# Patient Record
Sex: FEMALE | Race: BLACK OR AFRICAN AMERICAN | NOT HISPANIC OR LATINO | URBAN - METROPOLITAN AREA
[De-identification: names, ages, dates, MRNs, and addresses within clinical notes are randomized per-mention and may not be internally consistent; named-entity substitution may affect disease eponyms.]

---

## 2017-02-14 ENCOUNTER — HOSPITAL ENCOUNTER (EMERGENCY)
Facility: CLINIC | Age: 52
Discharge: HOME | End: 2017-02-14
Attending: EMERGENCY MEDICINE | Admitting: EMERGENCY MEDICINE
Payer: COMMERCIAL

## 2017-02-14 ENCOUNTER — APPOINTMENT (OUTPATIENT)
Dept: GENERAL RADIOLOGY | Facility: CLINIC | Age: 52
End: 2017-02-14
Attending: EMERGENCY MEDICINE
Payer: COMMERCIAL

## 2017-02-14 VITALS
SYSTOLIC BLOOD PRESSURE: 170 MMHG | WEIGHT: 293 LBS | HEIGHT: 69 IN | BODY MASS INDEX: 43.4 KG/M2 | HEART RATE: 100 BPM | OXYGEN SATURATION: 97 % | TEMPERATURE: 98.8 F | DIASTOLIC BLOOD PRESSURE: 88 MMHG

## 2017-02-14 DIAGNOSIS — M79.675 PAIN OF TOE OF LEFT FOOT: ICD-10-CM

## 2017-02-14 PROCEDURE — 25000132 ZZH RX MED GY IP 250 OP 250 PS 637: Performed by: EMERGENCY MEDICINE

## 2017-02-14 PROCEDURE — 73660 X-RAY EXAM OF TOE(S): CPT | Mod: LT

## 2017-02-14 PROCEDURE — 99283 EMERGENCY DEPT VISIT LOW MDM: CPT

## 2017-02-14 RX ORDER — ACETAMINOPHEN 500 MG
TABLET ORAL
Status: DISCONTINUED
Start: 2017-02-14 | End: 2017-02-14 | Stop reason: HOSPADM

## 2017-02-14 RX ORDER — ACETAMINOPHEN 500 MG
1000 TABLET ORAL ONCE
Status: COMPLETED | OUTPATIENT
Start: 2017-02-14 | End: 2017-02-14

## 2017-02-14 RX ADMIN — ACETAMINOPHEN 1000 MG: 500 TABLET ORAL at 11:43

## 2017-02-14 ASSESSMENT — ENCOUNTER SYMPTOMS
FEVER: 0
ROS SKIN COMMENTS: LEFT GREAT TOE PAIN

## 2017-02-14 NOTE — ED AVS SNAPSHOT
Emergency Department    [address] [facility] [address]    Phone:  [phone]    Fax:  [phone]                                       Terri Mullen   MRN: [id number]    Department:   Emergency Department   Date of Visit:  2/14/2017           Patient Information     Date Of Birth          1965        Your diagnoses for this visit were:     Pain of toe of left foot        You were seen by John Bowman MD.      Follow-up Information     Follow up with Clinic, [facility] In 3 days.    Contact information:    [address] [facility] [address] [phone]          Follow up with  Emergency Department.    Specialty:  EMERGENCY MEDICINE    Why:  As needed, If symptoms worsen    Contact information:    [address] [facility] [address] [phone]        Call Demarcus Barnes DPM.    Specialty:  Podiatry    Contact information:     [facility]  [address]  [facility] [address] [phone]          Discharge Instructions         Peripheral Neuropathy  Peripheral neuropathy is a condition that affects the nerves of the arms or legs. It causes a change in physical feeling. Sometimes it causes weakness in the muscles. You may feel tingling, numbness or shooting pains. Symptoms may be more common at night). Skin may be extra sensitive to light touch or temperature changes.  Neuropathy may be a complication of a chronic disease such as diabetes. A ruptured disk with pressure on the spinal nerve may also lead to the problem. Certain vitamin deficiencies may lead to it. It may also be caused by exposure to certain drugs or chemicals   Home care    Tell the healthcare provider about all medicines you take. This includes prescription and over-the-counter medicines, vitamins, and herbs. Ask if any of the medicines may be causing your problems. Do not make any changes to prescription medicines without talking to your  healthcare provider first.     You may be prescribed medicines to help relieve the tingling feeling or for pain. Take all medicines as directed.    A numb hand or foot may be more prone to injury. To help protect it:    Always use oven mitts.    Test water with an unaffected hand or foot.     Use caution when trimming nails. File sharp areas.    Wear shoes that fit well to avoid pressure points, blisters, and ulcers.    Inspect your hands and feet carefully (including the soles of your feet and between your toes) at least once a week. If you see red areas, sores, or other problems, tell your healthcare provider.  Follow-up care  Follow up with your doctor or as advised by our staff. You may need further testing or evaluation.  When to seek medical advice  Call your healthcare provider right away if any of the following occur:    Redness, swelling, cracking, or ulcer on any numb area, especially the feet    New symptoms of numbness or muscle weakness numbness    Loss of bowel or bladder control     Slurred speech, confusion, or trouble speaking, walking, or seeing    4524-0489 [employer]. [address] [place] [address]. All rights reserved. This information is not intended as a substitute for professional medical care. Always follow your healthcare professional's instructions.           Diabetic Foot Care  Diabetes can lead to a number of foot complications. Fortunately, most of these can be prevented with a little extra foot care. If diabetes is not well controlled, it can cause damage to blood vessels and result in poor circulation to the foot. When the skin does not get enough blood flow, it becomes prone to pressure sores and ulcers, which heal slowly.  Diabetes can also damage nerves, interfering with the ability to feel pain and pressure. When you can t feel your foot normally, it is easy to injure your skin, bones, and joints without knowing it. For these reasons diabetes increases  the risk of fungal infections, bunions, and ulcers. An ulcer is a sore or break in the skin. With ulcers, often the skin seems to have worn away. Deep ulcers can lead to bone infection.  Gangrene is the most serious foot complication of diabetes. It usually occurs on the tips of the toes as blackened areas of skin. The black area is dead tissue. In severe cases, gangrene spreads to involve the entire toe, other toes, and the entire foot. Foot or toe amputation may be required. Good foot care and blood sugar control can prevent this.   Home care    Wear comfortable, well-fitting shoes.    Wash your feet daily with warm water and mild soap.    After drying, apply a moisturizing cream or lotion.    Check your feet daily for skin breaks, blisters, swelling, or redness. Look between your toes as well. If you cannot see the bottoms of your feet, ask someone to look or use a mirror.    Wear cotton socks and change them every day.    Trim toenails carefully, and do not cut your cuticles.    Strive to keep your blood sugar under control with a combination of medicines, diet, and activity.    If you smoke and have diabetes, it is very important that you stop. Smoking reduces blood flow to your foot.    Schedule yearly foot exams.  Avoid activities that increase your risk of foot injury:    Do not walk barefoot.    Do not use heating pads or hot water bottles on your feet.    Do not put your foot in a hot tub without first checking the temperature with your hand.  Follow-up care  Follow up with your health care provider, or as advised. Report any cut, puncture, scrape, blister, or other injury to your foot. Also report if you have a bunion, ingrown toenail, or ulcer on your foot.   When to seek medical advice  Call your health care provider right away if any of these occur:    Black skin color anywhere on the foot    Open ulcer with pus draining from the wound    Increasing foot or leg pain    New areas of redness or swelling  or tender areas of the foot    Fever of 101 F or greater    5430-5928 [employer]. [address] [place] [address], [place] [address]. All rights reserved. This information is not intended as a substitute for professional medical care. Always follow your healthcare professional's instructions.            24 Hour Appointment Hotline       To make an appointment at any [facility], call [phone] ([phone]). If you don't have a family doctor or clinic, we will help you find one. [facility] are conveniently located to serve the needs of you and your family.             Review of your medicines      Our records show that you are taking the medicines listed below. If these are incorrect, please call your family doctor or clinic.        Dose / Directions Last dose taken    albuterol-ipratropium  MCG/ACT Inhaler   Commonly known as:  COMBIVENT   Dose:  2 puff        Inhale 2 puffs into the lungs every 6 hours as needed.   Refills:  0        * AMLODIPINE BESYLATE PO        Take  by mouth.   Refills:  0        * amLODIPine 5 MG tablet   Commonly known as:  NORVASC   Dose:  5 mg   Quantity:  30 tablet        Take 1 tablet by mouth daily.   Refills:  1        ASPIRIN PO   Dose:  81 mg        Take 81 mg by mouth.   Refills:  0        CITALOPRAM HYDROBROMIDE PO        Take  by mouth.   Refills:  0        HYDROcodone-acetaminophen 5-325 MG per tablet   Commonly known as:  NORCO   Dose:  1 tablet   Quantity:  15 tablet        Take 1 tablet by mouth every 4 hours as needed for pain   Refills:  0        LISINOPRIL PO        Take  by mouth.   Refills:  0        meclizine 12.5 MG tablet   Commonly known as:  ANTIVERT   Dose:  25 mg   Quantity:  30 tablet        Take 2 tablets by mouth 4 times daily as needed for dizziness.   Refills:  0        METOPROLOL SUCCINATE PO   Dose:  100 mg        Take 100 mg by mouth.   Refills:  0        NAPROXEN PO        Take  by mouth.   Refills:  0        NORTRIPTYLINE  HCL PO        Take  by mouth.   Refills:  0        OMEGA-3 FISH OIL PO        Take  by mouth.   Refills:  0        OMEPRAZOLE PO        Take  by mouth.   Refills:  0        SIMVASTATIN PO        Take  by mouth.   Refills:  0        SULINDAC PO        Take  by mouth.   Refills:  0        TRAZODONE HCL PO        Take  by mouth.   Refills:  0        * Notice:  This list has 2 medication(s) that are the same as other medications prescribed for you. Read the directions carefully, and ask your doctor or other care provider to review them with you.            Procedures and tests performed during your visit     XR Toe Left 2 Views      Orders Needing Specimen Collection     None      Pending Results     No orders found from 2/12/2017 to 2/15/2017.            Pending Culture Results     No orders found from 2/12/2017 to 2/15/2017.             Test Results from your hospital stay     2/14/2017 11:23 AM - Interface, Radiant Ib      Narrative     XR TOE LT G/E 2 VW 2/14/2017 11:19 AM    COMPARISON: None.    HISTORY: Distal toe pain.        Impression     IMPRESSION: No fractures are seen. Joints are preserved. No erosive  changes or radiodense foreign bodies are identified.    TRISTEN REYES                Clinical Quality Measure: Blood Pressure Screening     Your blood pressure was checked while you were in the emergency department today. The last reading we obtained was  BP: 170/88 . Please read the guidelines below about what these numbers mean and what you should do about them.  If your systolic blood pressure (the top number) is less than 120 and your diastolic blood pressure (the bottom number) is less than 80, then your blood pressure is normal. There is nothing more that you need to do about it.  If your systolic blood pressure (the top number) is 120-139 or your diastolic blood pressure (the bottom number) is 80-89, your blood pressure may be higher than it should be. You should have your blood pressure rechecked  "within a year by a primary care provider.  If your systolic blood pressure (the top number) is 140 or greater or your diastolic blood pressure (the bottom number) is 90 or greater, you may have high blood pressure. High blood pressure is treatable, but if left untreated over time it can put you at risk for heart attack, stroke, or kidney failure. You should have your blood pressure rechecked by a primary care provider within the next 4 weeks.  If your provider in the emergency department today gave you specific instructions to follow-up with your doctor or provider even sooner than that, you should follow that instruction and not wait for up to 4 weeks for your follow-up visit.        Thank you for choosing [facility]       Thank you for choosing [facility] for your care. Our goal is always to provide you with excellent care. Hearing back from our patients is one way we can continue to improve our services. Please take a few minutes to complete the written survey that you may receive in the mail after you visit with us. Thank you!        [employer] Information     [facility] lets you send messages to your doctor, view your test results, renew your prescriptions, schedule appointments and more. To sign up, go to [email or web address] . Click on \"Log in\" on the left side of the screen, which will take you to the Welcome page. Then click on \"Sign up Now\" on the right side of the page.     You will be asked to enter the access code listed below, as well as some personal information. Please follow the directions to create your username and password.     Your access code is: [email or web address]  Expires: 3/24/2017  3:35 AM     Your access code will  in 90 days. If you need help or a new code, please call your [facility] clinic or [phone].        Care EveryWhere ID     This is your Care EveryWhere ID. This could be used by other organizations to access your [facility] medical records  [id number]        After Visit Summary     "   This is your record. Keep this with you and show to your community pharmacist(s) and doctor(s) at your next visit.

## 2017-02-14 NOTE — DISCHARGE INSTRUCTIONS
Peripheral Neuropathy  Peripheral neuropathy is a condition that affects the nerves of the arms or legs. It causes a change in physical feeling. Sometimes it causes weakness in the muscles. You may feel tingling, numbness or shooting pains. Symptoms may be more common at night). Skin may be extra sensitive to light touch or temperature changes.  Neuropathy may be a complication of a chronic disease such as diabetes. A ruptured disk with pressure on the spinal nerve may also lead to the problem. Certain vitamin deficiencies may lead to it. It may also be caused by exposure to certain drugs or chemicals   Home care    Tell the healthcare provider about all medicines you take. This includes prescription and over-the-counter medicines, vitamins, and herbs. Ask if any of the medicines may be causing your problems. Do not make any changes to prescription medicines without talking to your healthcare provider first.     You may be prescribed medicines to help relieve the tingling feeling or for pain. Take all medicines as directed.    A numb hand or foot may be more prone to injury. To help protect it:    Always use oven mitts.    Test water with an unaffected hand or foot.     Use caution when trimming nails. File sharp areas.    Wear shoes that fit well to avoid pressure points, blisters, and ulcers.    Inspect your hands and feet carefully (including the soles of your feet and between your toes) at least once a week. If you see red areas, sores, or other problems, tell your healthcare provider.  Follow-up care  Follow up with your doctor or as advised by our staff. You may need further testing or evaluation.  When to seek medical advice  Call your healthcare provider right away if any of the following occur:    Redness, swelling, cracking, or ulcer on any numb area, especially the feet    New symptoms of numbness or muscle weakness numbness    Loss of bowel or bladder control     Slurred speech, confusion, or trouble  speaking, walking, or seeing    5015-0768 [employer]. [address] [place] [address], [place] [address]. All rights reserved. This information is not intended as a substitute for professional medical care. Always follow your healthcare professional's instructions.           Diabetic Foot Care  Diabetes can lead to a number of foot complications. Fortunately, most of these can be prevented with a little extra foot care. If diabetes is not well controlled, it can cause damage to blood vessels and result in poor circulation to the foot. When the skin does not get enough blood flow, it becomes prone to pressure sores and ulcers, which heal slowly.  Diabetes can also damage nerves, interfering with the ability to feel pain and pressure. When you can t feel your foot normally, it is easy to injure your skin, bones, and joints without knowing it. For these reasons diabetes increases the risk of fungal infections, bunions, and ulcers. An ulcer is a sore or break in the skin. With ulcers, often the skin seems to have worn away. Deep ulcers can lead to bone infection.  Gangrene is the most serious foot complication of diabetes. It usually occurs on the tips of the toes as blackened areas of skin. The black area is dead tissue. In severe cases, gangrene spreads to involve the entire toe, other toes, and the entire foot. Foot or toe amputation may be required. Good foot care and blood sugar control can prevent this.   Home care    Wear comfortable, well-fitting shoes.    Wash your feet daily with warm water and mild soap.    After drying, apply a moisturizing cream or lotion.    Check your feet daily for skin breaks, blisters, swelling, or redness. Look between your toes as well. If you cannot see the bottoms of your feet, ask someone to look or use a mirror.    Wear cotton socks and change them every day.    Trim toenails carefully, and do not cut your cuticles.    Strive to keep your blood sugar under control with a  combination of medicines, diet, and activity.    If you smoke and have diabetes, it is very important that you stop. Smoking reduces blood flow to your foot.    Schedule yearly foot exams.  Avoid activities that increase your risk of foot injury:    Do not walk barefoot.    Do not use heating pads or hot water bottles on your feet.    Do not put your foot in a hot tub without first checking the temperature with your hand.  Follow-up care  Follow up with your health care provider, or as advised. Report any cut, puncture, scrape, blister, or other injury to your foot. Also report if you have a bunion, ingrown toenail, or ulcer on your foot.   When to seek medical advice  Call your health care provider right away if any of these occur:    Black skin color anywhere on the foot    Open ulcer with pus draining from the wound    Increasing foot or leg pain    New areas of redness or swelling or tender areas of the foot    Fever of 101 F or greater    1294-7802 [employer]. [address] [place] [address], [place] [address]. All rights reserved. This information is not intended as a substitute for professional medical care. Always follow your healthcare professional's instructions.

## 2017-02-14 NOTE — ED PROVIDER NOTES
"  History     Chief Complaint:  Toe Pain    HPI   Terri Mullen is a 52 year old female with diabetes, hypertension, and neuropathy who presents to the emergency department today for evaluation of left great toe pain. The patient reports that she has no prior history of left great toe pain and she denies any trauma to the left great toe. The patient reports that she has taken some Tylenol and Naproxen with no pain relief and she states that it hurts to walk. The patient reports that she takes her glucose level twice every day and she states that the last time she checked it was 276. She states that she takes insulin four times per day and last talked to her doctor about her blood sugar on 02/01/2017.     Allergies:  Lisinopril      Medications:    Norco  Norvasc  Antivert  Metoprolol  Aspirin  Nortriptyline  Amlodipine  Trazodone  Omeprazole  Combivent  Lisinopril  Sulindac  Citalopram  Naproxen    Past Medical History:    Arthritis   Asthma  Diabetes Mellitus  Hypertension   Vertigo   Fibromyalgia  Neuropathy    Past Surgical History:    No past surgical history on file.    Family History:    No family history on file.    Social History:  The patient was accompanied to the ED by niece.  Smoking Status: Current Every Day Smoker  Alcohol Use: Positive  Marital Status:  Single [1]     Review of Systems   Constitutional: Negative for fever.   Skin: Negative for rash.        Left great toe pain   All other systems reviewed and are negative.    Physical Exam   Vitals:  Patient Vitals for the past 24 hrs:   BP Temp Temp src Pulse SpO2 Height Weight   02/14/17 1015 170/88 98.8  F (37.1  C) Oral 100 97 % 1.753 m (5' 9\") (!) 158.3 kg (349 lb)       Physical Exam  General: Appears well-developed and well-nourished.   Head: No signs of trauma.   CV: Normal rate and regular rhythm.    Resp: Effort normal and breath sounds normal. No respiratory distress.   MSK: Normal range of motion. no edema. No Calf tenderness.  Neuro: The " patient is alert and oriented.  Strength in lower extremities normal and symmetrical.   Sensation normal. Speech normal.  GCS 15  Skin: Skin is warm and dry. No rash noted. Callous with exposed skin under callous to distal left great toe.  No joint swelling.  No erythema or drainage noted.  Psych: normal mood and affect. behavior is normal.       Emergency Department Course     Imaging:  Radiology findings were communicated with the patient who voiced understanding of the findings.    XR Toe Left 2 Views  No fractures are seen. Joints are preserved. No erosive  changes or radiodense foreign bodies are identified.  TRISTEN REYES  Reading per radiology    Interventions:  1143 Tylenol 1000 mg PO    Emergency Department Course:  Nursing notes and vitals reviewed.  I performed an exam of the patient as documented above.   The patient was sent for a XR Toe Left 2 Views while in the emergency department, results above.   I discussed the treatment plan with the patient. They expressed understanding of this plan and consented to discharge. They will be discharged home with instructions for care and follow up. In addition, the patient will return to the emergency department if their symptoms persist, worsen, if new symptoms arise or if there is any concern.  All questions were answered.  I personally reviewed the imaging results with the patient and answered all related questions prior to discharge.  Impression & Plan      Medical Decision Making:  Terri Mullen is a 52 year old woman who presents due to pain to the left great toe. She does have a history of diabetes and neuropathy and in speaking with her it seems that her diabetes is fairly poorly controlled. She also has a history of fibromyalgia and chronic recurring pain and actually has a care plan with regards to the amount of opiates she has been receiving. On my evaluation, she did have some callusing of the skin and it seems that a callus had come off the distal  aspect of the great toe. There are no signs of infection and no swelling of the joint. I did obtain and x-ray to ensure that there were no signs of trauma. The x-ray was negative. Based on the description of her symptoms and her history, I believe her pain is related to diabetes that is poorly controlled and peripheral neuropathy. The patient did ask for pain medication her in the emergency department and I did offer her Tylenol but stated that I did not feel that opiates were indicated for this. She was recommended to follow up closely with her primary care physician to continue to discuss diabetes education and management. I also gave her contact information to follow up with podiatry for additional diabetic foot care. I instructed her to return to the emergency department for any worsening symptoms.     Diagnosis:    ICD-10-CM    1. Pain of toe of left foot M79.675      Disposition:   The patient is discharged to home.    Scribe Disclosure:  I, John Sung, am serving as a scribe at 10:23 AM on 2/14/2017 to document services personally performed by John Bowman MD, based on my observations and the provider's statements to me.    2/14/2017    EMERGENCY DEPARTMENT       John Bowman MD  02/14/17 4990

## 2017-02-14 NOTE — ED AVS SNAPSHOT
Emergency Department    [address] [place] [address]    Phone:  [phone]    Fax:  [phone]                                       Terri Mullen   MRN: [id number]    Department:   Emergency Department   Date of Visit:  2/14/2017           After Visit Summary Signature Page     I have received my discharge instructions, and my questions have been answered. I have discussed any challenges I see with this plan with the nurse or doctor.    ..........................................................................................................................................  Patient/Patient Representative Signature      ..........................................................................................................................................  Patient Representative Print Name and Relationship to Patient    ..................................................               ................................................  Date                                            Time    ..........................................................................................................................................  Reviewed by Signature/Title    ...................................................              ..............................................  Date                                                            Time

## 2018-04-10 ENCOUNTER — HOSPITAL ENCOUNTER (EMERGENCY)
Facility: CLINIC | Age: 53
Discharge: HOME | End: 2018-04-10
Attending: EMERGENCY MEDICINE | Admitting: EMERGENCY MEDICINE
Payer: MEDICAID

## 2018-04-10 VITALS
TEMPERATURE: 98.1 F | DIASTOLIC BLOOD PRESSURE: 89 MMHG | HEIGHT: 69 IN | RESPIRATION RATE: 20 BRPM | WEIGHT: 293 LBS | OXYGEN SATURATION: 100 % | SYSTOLIC BLOOD PRESSURE: 147 MMHG | HEART RATE: 87 BPM | BODY MASS INDEX: 43.4 KG/M2

## 2018-04-10 DIAGNOSIS — R52 GENERALIZED PAIN: ICD-10-CM

## 2018-04-10 LAB
ALBUMIN SERPL-MCNC: 3.8 G/DL (ref 3.4–5)
ALBUMIN UR-MCNC: 10 MG/DL
ALP SERPL-CCNC: 166 U/L (ref 40–150)
ALT SERPL W P-5'-P-CCNC: 23 U/L (ref 0–50)
ANION GAP SERPL CALCULATED.3IONS-SCNC: 8 MMOL/L (ref 3–14)
APPEARANCE UR: CLEAR
AST SERPL W P-5'-P-CCNC: 12 U/L (ref 0–45)
BASOPHILS # BLD AUTO: 0 10E9/L (ref 0–0.2)
BASOPHILS NFR BLD AUTO: 0.3 %
BILIRUB SERPL-MCNC: 0.1 MG/DL (ref 0.2–1.3)
BILIRUB UR QL STRIP: NEGATIVE
BUN SERPL-MCNC: 21 MG/DL (ref 7–30)
CALCIUM SERPL-MCNC: 9 MG/DL (ref 8.5–10.1)
CHLORIDE SERPL-SCNC: 102 MMOL/L (ref 94–109)
CO2 SERPL-SCNC: 28 MMOL/L (ref 20–32)
COLOR UR AUTO: YELLOW
CREAT SERPL-MCNC: 0.93 MG/DL (ref 0.52–1.04)
DIFFERENTIAL METHOD BLD: ABNORMAL
EOSINOPHIL # BLD AUTO: 0.1 10E9/L (ref 0–0.7)
EOSINOPHIL NFR BLD AUTO: 2.2 %
ERYTHROCYTE [DISTWIDTH] IN BLOOD BY AUTOMATED COUNT: 15.7 % (ref 10–15)
GFR SERPL CREATININE-BSD FRML MDRD: 64 ML/MIN/1.7M2
GLUCOSE SERPL-MCNC: 214 MG/DL (ref 70–99)
GLUCOSE UR STRIP-MCNC: NEGATIVE MG/DL
HCT VFR BLD AUTO: 37.9 % (ref 35–47)
HGB BLD-MCNC: 11.7 G/DL (ref 11.7–15.7)
HGB UR QL STRIP: NEGATIVE
IMM GRANULOCYTES # BLD: 0 10E9/L (ref 0–0.4)
IMM GRANULOCYTES NFR BLD: 0.2 %
INTERPRETATION ECG - MUSE: NORMAL
KETONES UR STRIP-MCNC: NEGATIVE MG/DL
LEUKOCYTE ESTERASE UR QL STRIP: ABNORMAL
LYMPHOCYTES # BLD AUTO: 2.3 10E9/L (ref 0.8–5.3)
LYMPHOCYTES NFR BLD AUTO: 36.1 %
MCH RBC QN AUTO: 23.7 PG (ref 26.5–33)
MCHC RBC AUTO-ENTMCNC: 30.9 G/DL (ref 31.5–36.5)
MCV RBC AUTO: 77 FL (ref 78–100)
MONOCYTES # BLD AUTO: 0.4 10E9/L (ref 0–1.3)
MONOCYTES NFR BLD AUTO: 6.5 %
MUCOUS THREADS #/AREA URNS LPF: PRESENT /LPF
NEUTROPHILS # BLD AUTO: 3.5 10E9/L (ref 1.6–8.3)
NEUTROPHILS NFR BLD AUTO: 54.7 %
NITRATE UR QL: NEGATIVE
NRBC # BLD AUTO: 0 10*3/UL
NRBC BLD AUTO-RTO: 0 /100
PH UR STRIP: 7 PH (ref 5–7)
PLATELET # BLD AUTO: 236 10E9/L (ref 150–450)
POTASSIUM SERPL-SCNC: 3.3 MMOL/L (ref 3.4–5.3)
PROT SERPL-MCNC: 8.2 G/DL (ref 6.8–8.8)
RBC # BLD AUTO: 4.93 10E12/L (ref 3.8–5.2)
RBC #/AREA URNS AUTO: <1 /HPF (ref 0–2)
SODIUM SERPL-SCNC: 138 MMOL/L (ref 133–144)
SOURCE: ABNORMAL
SP GR UR STRIP: 1.02 (ref 1–1.03)
SQUAMOUS #/AREA URNS AUTO: 3 /HPF (ref 0–1)
UROBILINOGEN UR STRIP-MCNC: NORMAL MG/DL (ref 0–2)
WBC # BLD AUTO: 6.3 10E9/L (ref 4–11)
WBC #/AREA URNS AUTO: 2 /HPF (ref 0–5)

## 2018-04-10 PROCEDURE — 99284 EMERGENCY DEPT VISIT MOD MDM: CPT

## 2018-04-10 PROCEDURE — 93005 ELECTROCARDIOGRAM TRACING: CPT

## 2018-04-10 PROCEDURE — 85025 COMPLETE CBC W/AUTO DIFF WBC: CPT | Performed by: EMERGENCY MEDICINE

## 2018-04-10 PROCEDURE — 36415 COLL VENOUS BLD VENIPUNCTURE: CPT | Performed by: EMERGENCY MEDICINE

## 2018-04-10 PROCEDURE — 81001 URINALYSIS AUTO W/SCOPE: CPT | Performed by: EMERGENCY MEDICINE

## 2018-04-10 PROCEDURE — 80053 COMPREHEN METABOLIC PANEL: CPT | Performed by: EMERGENCY MEDICINE

## 2018-04-10 ASSESSMENT — ENCOUNTER SYMPTOMS
FEVER: 0
SHORTNESS OF BREATH: 0

## 2018-04-10 NOTE — ED AVS SNAPSHOT
Emergency Department    [address] [place] [address]    Phone:  [phone]    Fax:  [phone]                                       Terri Mullen   MRN: [id number]    Department:   Emergency Department   Date of Visit:  4/10/2018           After Visit Summary Signature Page     I have received my discharge instructions, and my questions have been answered. I have discussed any challenges I see with this plan with the nurse or doctor.    ..........................................................................................................................................  Patient/Patient Representative Signature      ..........................................................................................................................................  Patient Representative Print Name and Relationship to Patient    ..................................................               ................................................  Date                                            Time    ..........................................................................................................................................  Reviewed by Signature/Title    ...................................................              ..............................................  Date                                                            Time

## 2018-04-10 NOTE — ED PROVIDER NOTES
History     Chief Complaint:  Leg Pain      HPI   Terri Mullen is a 53 year old female with a history of hypertension and diabetes mellitus who presents to the emergency department today via EMS for evaluation of leg pain. The patient states that she is experiencing cramps in her upper and lower extremities, making it hard for her to raise her arms above her head and ambulate. She states that this has been going on for the past week at least, not getting any better, which is why she called EMS this evening. The patient notes that she has medication for chronic pain, and she indicates taking Norco this morning.  The patient additionally states that she is out of test strips and has been unable to check her blood sugar, though she does note that she saw her doctor approximately 4 days ago.  She states that the last time she checked her blood sugar was 2 days ago when it was in the 300's and she notes that her blood sugar runs high at baseline. She states that she has been taking her Novolog. She denies any fevers, chest pain, or shortness of breath.       Allergies:  Lisinopril- leg swelling     Medications:    HYDROcodone-acetaminophen (NORCO) 5-325 MG per tablet  amLODIPine (NORVASC) 5 MG tablet  meclizine (ANTIVERT) 12.5 MG tablet  METOPROLOL SUCCINATE PO  ASPIRIN PO  NORTRIPTYLINE HCL PO  AMLODIPINE BESYLATE PO  TRAZODONE HCL PO  OMEPRAZOLE PO  Omega-3 Fatty Acids (OMEGA-3 FISH OIL PO)  albuterol-ipratropium (COMBIVENT)  MCG/ACT inhaler  LISINOPRIL PO  SIMVASTATIN PO  SULINDAC PO  CITALOPRAM HYDROBROMIDE PO  NAPROXEN PO     Past Medical History:    Arthritis  Asthma  Diabetes mellitus  Hypertension  Vertigo    Past Surgical History:    History reviewed.  No significant past surgical history.     Family History:    History reviewed.  No significant family history.    Social History:  Smoking Status: current every day smoker  Smokeless Tobacco: negative  Alcohol Use: positive  Marital Status:  Single [1]  "    Review of Systems   Constitutional: Negative for fever.   Respiratory: Negative for shortness of breath.    Cardiovascular: Negative for chest pain.   Musculoskeletal:        Positive for pain in bilateral upper and lower extremities.   All other systems reviewed and are negative.      Physical Exam     Patient Vitals for the past 24 hrs:   BP Temp Temp src Pulse Resp SpO2 Height Weight   04/10/18 1842 147/89 98.1  F (36.7  C) Oral 87 20 100 % 1.753 m (5' 9\") (!) 163.3 kg (360 lb)     Physical Exam  General: Appears well-developed and well-nourished.   Head: No signs of trauma.   Mouth/Throat: Oropharynx is clear and moist.   CV: Normal rate and regular rhythm.    Resp: Effort normal and breath sounds normal. No respiratory distress.   GI: Soft. There is no tenderness.  No rebound or guarding.  Normal bowel sounds.  No CVA tenderness.  MSK: Normal range of motion. no edema. No Calf tenderness.  Neuro: The patient is alert and oriented.  Strength in upper/lower extremities normal and symmetrical.   Sensation normal. Speech normal.  GCS 15  Skin: Skin is warm and dry. No rash noted.   Psych: normal mood and affect. behavior is normal.       Emergency Department Course   ECG:  Indication: Leg Pain  Completed at 1904.  Read at 1912.   Rate 82 bpm. HI interval 152 ms. QRS duration 80 ms. QT/QTc 408/476 ms. P-R-T axes 53 8 27.  Normal sinus rhythm. Normal ECG. No significant changes form past ECG done on 12/10/2015.     Laboratory:  CBC: MCH 23.7 (L), MCHC 30.9 (L), RDW 15.7 (H), MCV 77 (L) o/w WNL. (WBC 6.3, HGB 11.7, )   CMP: Potassium 3.3 (L), Glucose 214 (H) o/w WNL. (Creatinine: 0.93)  UA with Microscopic: Protein Albumin 10 (A), Moderate Leukocyte Esterase (A), 3 Squamous Epithelial (H), Mucous Present (A)    Emergency Department Course:  Nursing notes and vitals reviewed. I performed an exam of the patient as documented above.    Blood drawn. This was sent to the lab for further testing, results " above.    The patient provided a urine sample here in the emergency department. This was sent for laboratory testing, findings above.     1958 I reevaluated the patient and provided an update in regards to her ED course.      Findings and plan explained to the Patient. Patient discharged home with instructions regarding supportive care, medications, and reasons to return. The importance of close follow-up was reviewed.     Impression & Plan    Medical Decision Making:  Terri Mullen is a 53 year old female who presents due to generally not feeling well.  She complains of leg pain along with pain with movement which is very chronic for her and she actually is seen in the pain clinic. She also states that she feels generally unwell.  She states that she has been out of her blood sugar test strips for a couple of days.  Blood work was obtained that did show a slight degree of hyperglycemia, but is within the normal range for the patient and is not significantly elevated.  There is no signs of DKA.  UA was also obtained and was unremarkable.  The patient's symptoms seemed to be more chronic in nature, and I recommended that she call her primary care doctor in the morning to get a refill of any diabetes supplies that she may need and she continue with her current pain regimen and speak with the pain clinic for any additional pain related needs that she may have.  The patient was discharged home via medical cab.      Diagnosis:    ICD-10-CM    1. Generalized pain R52        Disposition:  Discharged to home.     Scribe Disclosure:  I, Leanne Tyler, am serving as a scribe on 4/10/2018 at 6:47 PM to personally document services performed by John Bowman MD based on my observations and the provider's statements to me.     Leanne Tyler  4/10/2018    EMERGENCY DEPARTMENT       John Bowman MD  04/14/18 5494

## 2018-04-10 NOTE — ED AVS SNAPSHOT
Emergency Department    [address] [facility] [address]    Phone:  [phone]    Fax:  [phone]                                       Terri Mullen   MRN: [id number]    Department:   Emergency Department   Date of Visit:  4/10/2018           Patient Information     Date Of Birth          1965        Your diagnoses for this visit were:     Generalized pain        You were seen by John Bowman MD.      Follow-up Information     Call Clinic, [facility].    Contact information:    [address] [facility] [address] [phone]          Follow up with  Emergency Department.    Specialty:  EMERGENCY MEDICINE    Why:  As needed, If symptoms worsen    Contact information:    [address] [facility] [address] [phone]        Discharge Instructions       Discharge Instructions  Chronic Pain  You were seen today for an issue regarding chronic or recurrent pain. You may have a condition that gives you pain every day, or a condition that causes pain that keeps coming back, or several conditions involving pain.   Many patients with chronic or recurrent pain come to the Emergency Department thinking that a shot of narcotic pain medicine or a prescription for pain pills to take home is the best answer to their problem. We have discovered, though, that these approaches put our patients at high risk of long-term problems. This sort of treatment may actually make your pain worse, make it harder to control, and put you at an unacceptable risk of complications.   Because of the risks, we are very hesitant to treat your type of pain with short-acting or potentially habit-forming medications. These medications represent a significant risk to your health, and need to be managed by a physician who can follow your care consistently.  We have established a policy for the treatment of patients with chronic or recurrent pain that does not allow for treatment with  injection narcotics or take-home prescriptions for narcotics.  We will treat your symptoms with non-narcotic medications and other treatments, and we will make referrals to pain specialists or other specialized providers if we think such referrals would benefit you.  You should expect to receive treatment consistent with this policy during future visits.    If you have concerns about our policy, please discuss them with your primary care provider or pain specialist, who can contact us if necessary for further information or clarification.  If you were given a prescription for medicine here today, be sure to read all of the information (including the package insert) that comes with your prescription.  This will include important information about the medicine, its side effects, and any warnings that you need to know about.  The pharmacist who fills the prescription can provide more information and answer questions you may have about the medicine.  If you have questions or concerns that the pharmacist cannot address, please call or return to the Emergency Department.   Remember that you can always come back to the Emergency Department if you develop any new symptoms or if there is anything that worries you.        High Blood Sugar (Hyperglycemia)     When you have hyperglycemia, drink plenty of water or other sugar-free, caffeine-free liquids.   Too much glucose (sugar) in your blood is called hyperglycemia or high blood sugar. High blood sugar can lead to a dangerous condition called ketoacidosis. In severe cases, it can lead to dehydration and coma.  Possible causes of hyperglycemia    Inadequate treatment plan for diabetes     Being sick    Being under stress    Taking certain medicines, such as steroids    Eating too much food, especially carbohydrates    Being less active than usual    Not taking enough diabetes medicine  Symptoms of hyperglycemia  Hyperglycemia may not cause symptoms. If you do have symptoms,  they may include:    Thirst    Frequent need to urinate    Feeling tired    Nausea and vomiting    Itchy, dry skin    Blurry vision    Fast breathing and breath that smells fruity     Weakness    Dizziness    Wounds or skin infections that don t heal    Unexplained weight loss if hyperglycemia lasts for more than a few days   What you should do  Make sure you do the following:    Check your blood sugar.    Drink plenty of sugar-free, caffeine-free liquids such as water. Don t drink fruit juice.    Check your blood sugar again every 4 hours. If you take insulin or diabetes medicines, follow your sick-day plan for taking medicine. Call your healthcare provider if you are not able to eat.    Check your blood or urine for ketones as directed.    Call your healthcare provider if your blood sugar and ketones do not return to your target range.  Preventing high blood sugar  To help keep your blood sugar from getting too high:    Control stress.    When you're ill, follow your sick-day plan.     Follow your meal plan. Eat only the amount of food on your meal plan    Follow your exercise plan.    Take your insulin or diabetes medicines as directed by your healthcare team. Also test your blood sugar as directed. If the plan is not working for you, discuss it with your healthcare provider.  Other things to do    Carry a medical ID card, a compact USB drive, or wear a medical alert bracelet or necklace. It should say that you have diabetes. It should also say what to do in case you pass out or go into a coma.    Make sure family, friends, and coworkers know the signs of high blood sugar. Tell them what to do if your blood sugar gets very high and you can t help yourself.    Talk to your healthcare team about other things you can do to prevent high blood sugar.   Special note: Drink plenty of sugar-free and caffeine-free liquids when you feel symptoms of hyperglycemia. Call your healthcare provider if you keep having episodes of  hyperglycemia.   Date Last Reviewed: 5/1/2016 2000-2017 [employer]. [address] [place] [address], [place] [address]. All rights reserved. This information is not intended as a substitute for professional medical care. Always follow your healthcare professional's instructions.          24 Hour Appointment Hotline       To make an appointment at any [facility], call [phone] ([phone]). If you don't have a family doctor or clinic, we will help you find one. [facility] are conveniently located to serve the needs of you and your family.             Review of your medicines      Our records show that you are taking the medicines listed below. If these are incorrect, please call your family doctor or clinic.        Dose / Directions Last dose taken    albuterol-ipratropium  MCG/ACT Inhaler   Commonly known as:  COMBIVENT   Dose:  2 puff        Inhale 2 puffs into the lungs every 6 hours as needed.   Refills:  0        * AMLODIPINE BESYLATE PO        Take  by mouth.   Refills:  0        * amLODIPine 5 MG tablet   Commonly known as:  NORVASC   Dose:  5 mg   Quantity:  30 tablet        Take 1 tablet by mouth daily.   Refills:  1        ASPIRIN PO   Dose:  81 mg        Take 81 mg by mouth.   Refills:  0        CITALOPRAM HYDROBROMIDE PO        Take  by mouth.   Refills:  0        HYDROcodone-acetaminophen 5-325 MG per tablet   Commonly known as:  NORCO   Dose:  1 tablet   Quantity:  15 tablet        Take 1 tablet by mouth every 4 hours as needed for pain   Refills:  0        LISINOPRIL PO        Take  by mouth.   Refills:  0        meclizine 12.5 MG tablet   Commonly known as:  ANTIVERT   Dose:  25 mg   Quantity:  30 tablet        Take 2 tablets by mouth 4 times daily as needed for dizziness.   Refills:  0        METOPROLOL SUCCINATE PO   Dose:  100 mg        Take 100 mg by mouth.   Refills:  0        NAPROXEN PO        Take  by mouth.   Refills:  0        NORTRIPTYLINE HCL PO         Take  by mouth.   Refills:  0        OMEGA-3 FISH OIL PO        Take  by mouth.   Refills:  0        OMEPRAZOLE PO        Take  by mouth.   Refills:  0        SIMVASTATIN PO        Take  by mouth.   Refills:  0        SULINDAC PO        Take  by mouth.   Refills:  0        TRAZODONE HCL PO        Take  by mouth.   Refills:  0        * Notice:  This list has 2 medication(s) that are the same as other medications prescribed for you. Read the directions carefully, and ask your doctor or other care provider to review them with you.            Procedures and tests performed during your visit     CBC with platelets + differential    Comprehensive metabolic panel    EKG 12 lead    UA with Microscopic      Orders Needing Specimen Collection     None      Pending Results     No orders found from 4/8/2018 to 4/11/2018.            Pending Culture Results     No orders found from 4/8/2018 to 4/11/2018.            Pending Results Instructions     If you had any lab results that were not finalized at the time of your Discharge, you can call the ED Lab Result RN at [phone]. You will be contacted by this team for any positive Lab results or changes in treatment. The nurses are available 7 days a week from 10A to 6:30P.  You can leave a message 24 hours per day and they will return your call.        Test Results From Your Hospital Stay        4/10/2018  7:44 PM      Component Results     Component Value Ref Range & Units Status    Color Urine Yellow  Final    Appearance Urine Clear  Final    Glucose Urine Negative NEG^Negative mg/dL Final    Bilirubin Urine Negative NEG^Negative Final    Ketones Urine Negative NEG^Negative mg/dL Final    Specific Gravity Urine 1.016 1.003 - 1.035 Final    Blood Urine Negative NEG^Negative Final    pH Urine 7.0 5.0 - 7.0 pH Final    Protein Albumin Urine 10 (A) NEG^Negative mg/dL Final    Urobilinogen mg/dL Normal 0.0 - 2.0 mg/dL Final    Nitrite Urine Negative NEG^Negative Final    Leukocyte  Esterase Urine Moderate (A) NEG^Negative Final    Source Midstream Urine  Final    WBC Urine 2 0 - 5 /HPF Final    RBC Urine <1 0 - 2 /HPF Final    Squamous Epithelial /HPF Urine 3 (H) 0 - 1 /HPF Final    Mucous Urine Present (A) NEG^Negative /LPF Final         4/10/2018  7:26 PM      Component Results     Component Value Ref Range & Units Status    WBC 6.3 4.0 - 11.0 10e9/L Final    RBC Count 4.93 3.8 - 5.2 10e12/L Final    Hemoglobin 11.7 11.7 - 15.7 g/dL Final    Hematocrit 37.9 35.0 - 47.0 % Final    MCV 77 (L) 78 - 100 fl Final    MCH 23.7 (L) 26.5 - 33.0 pg Final    MCHC 30.9 (L) 31.5 - 36.5 g/dL Final    RDW 15.7 (H) 10.0 - 15.0 % Final    Platelet Count 236 150 - 450 10e9/L Final    Diff Method Automated Method  Final    % Neutrophils 54.7 % Final    % Lymphocytes 36.1 % Final    % Monocytes 6.5 % Final    % Eosinophils 2.2 % Final    % Basophils 0.3 % Final    % Immature Granulocytes 0.2 % Final    Nucleated RBCs 0 0 /100 Final    Absolute Neutrophil 3.5 1.6 - 8.3 10e9/L Final    Absolute Lymphocytes 2.3 0.8 - 5.3 10e9/L Final    Absolute Monocytes 0.4 0.0 - 1.3 10e9/L Final    Absolute Eosinophils 0.1 0.0 - 0.7 10e9/L Final    Absolute Basophils 0.0 0.0 - 0.2 10e9/L Final    Abs Immature Granulocytes 0.0 0 - 0.4 10e9/L Final    Absolute Nucleated RBC 0.0  Final         4/10/2018  7:42 PM      Component Results     Component Value Ref Range & Units Status    Sodium 138 133 - 144 mmol/L Final    Potassium 3.3 (L) 3.4 - 5.3 mmol/L Final    Chloride 102 94 - 109 mmol/L Final    Carbon Dioxide 28 20 - 32 mmol/L Final    Anion Gap 8 3 - 14 mmol/L Final    Glucose 214 (H) 70 - 99 mg/dL Final    Urea Nitrogen 21 7 - 30 mg/dL Final    Creatinine 0.93 0.52 - 1.04 mg/dL Final    GFR Estimate 64 >60 mL/min/1.7m2 Final    Non  GFR Calc    GFR Estimate If Black 77 >60 mL/min/1.7m2 Final    African American GFR Calc    Calcium 9.0 8.5 - 10.1 mg/dL Final    Bilirubin Total 0.1 (L) 0.2 - 1.3 mg/dL Final     Albumin 3.8 3.4 - 5.0 g/dL Final    Protein Total 8.2 6.8 - 8.8 g/dL Final    Alkaline Phosphatase 166 (H) 40 - 150 U/L Final    ALT 23 0 - 50 U/L Final    AST 12 0 - 45 U/L Final                Clinical Quality Measure: Blood Pressure Screening     Your blood pressure was checked while you were in the emergency department today. The last reading we obtained was  BP: 147/89 . Please read the guidelines below about what these numbers mean and what you should do about them.  If your systolic blood pressure (the top number) is less than 120 and your diastolic blood pressure (the bottom number) is less than 80, then your blood pressure is normal. There is nothing more that you need to do about it.  If your systolic blood pressure (the top number) is 120-139 or your diastolic blood pressure (the bottom number) is 80-89, your blood pressure may be higher than it should be. You should have your blood pressure rechecked within a year by a primary care provider.  If your systolic blood pressure (the top number) is 140 or greater or your diastolic blood pressure (the bottom number) is 90 or greater, you may have high blood pressure. High blood pressure is treatable, but if left untreated over time it can put you at risk for heart attack, stroke, or kidney failure. You should have your blood pressure rechecked by a primary care provider within the next 4 weeks.  If your provider in the emergency department today gave you specific instructions to follow-up with your doctor or provider even sooner than that, you should follow that instruction and not wait for up to 4 weeks for your follow-up visit.        Thank you for choosing [facility]       Thank you for choosing [facility] for your care. Our goal is always to provide you with excellent care. Hearing back from our patients is one way we can continue to improve our services. Please take a few minutes to complete the written survey that you may receive in the mail after you visit  "with us. Thank you!        [employer] Information     [employer] lets you send messages to your doctor, view your test results, renew your prescriptions, schedule appointments and more. To sign up, go to [email or web address] . Click on \"Log in\" on the left side of the screen, which will take you to the Welcome page. Then click on \"Sign up Now\" on the right side of the page.     You will be asked to enter the access code listed below, as well as some personal information. Please follow the directions to create your username and password.     Your access code is: [email or web address]  Expires: 2018  8:18 PM     Your access code will  in 90 days. If you need help or a new code, please call your [facility] clinic or [phone].        Care EveryWhere ID     This is your Care EveryWhere ID. This could be used by other organizations to access your [facility] medical records  [id number]        Equal Access to Services     DELORES GARRETT : Hadii lemuel lawrence hadasho Soedmondali, waaxda luqadaha, qaybta kaalmada adeegyanoemy, eugene russell . So [facility] [phone].    ATENCIÓN: Si habla español, tiene a burr disposición servicios gratuitos de asistencia lingüística. Llame al [phone].    We comply with applicable federal civil rights laws and [place] laws. We do not discriminate on the basis of race, color, national origin, age, disability, sex, sexual orientation, or gender identity.            After Visit Summary       This is your record. Keep this with you and show to your community pharmacist(s) and doctor(s) at your next visit.                  "

## 2018-04-10 NOTE — ED NOTES
Bed: ED22  Expected date:   Expected time:   Means of arrival:   Comments:  535 53F chronic pain, green

## 2018-04-11 NOTE — DISCHARGE INSTRUCTIONS
Discharge Instructions  Chronic Pain  You were seen today for an issue regarding chronic or recurrent pain. You may have a condition that gives you pain every day, or a condition that causes pain that keeps coming back, or several conditions involving pain.   Many patients with chronic or recurrent pain come to the Emergency Department thinking that a shot of narcotic pain medicine or a prescription for pain pills to take home is the best answer to their problem. We have discovered, though, that these approaches put our patients at high risk of long-term problems. This sort of treatment may actually make your pain worse, make it harder to control, and put you at an unacceptable risk of complications.   Because of the risks, we are very hesitant to treat your type of pain with short-acting or potentially habit-forming medications. These medications represent a significant risk to your health, and need to be managed by a physician who can follow your care consistently.  We have established a policy for the treatment of patients with chronic or recurrent pain that does not allow for treatment with injection narcotics or take-home prescriptions for narcotics.  We will treat your symptoms with non-narcotic medications and other treatments, and we will make referrals to pain specialists or other specialized providers if we think such referrals would benefit you.  You should expect to receive treatment consistent with this policy during future visits.    If you have concerns about our policy, please discuss them with your primary care provider or pain specialist, who can contact us if necessary for further information or clarification.  If you were given a prescription for medicine here today, be sure to read all of the information (including the package insert) that comes with your prescription.  This will include important information about the medicine, its side effects, and any warnings that you need to know about.   The pharmacist who fills the prescription can provide more information and answer questions you may have about the medicine.  If you have questions or concerns that the pharmacist cannot address, please call or return to the Emergency Department.   Remember that you can always come back to the Emergency Department if you develop any new symptoms or if there is anything that worries you.        High Blood Sugar (Hyperglycemia)     When you have hyperglycemia, drink plenty of water or other sugar-free, caffeine-free liquids.   Too much glucose (sugar) in your blood is called hyperglycemia or high blood sugar. High blood sugar can lead to a dangerous condition called ketoacidosis. In severe cases, it can lead to dehydration and coma.  Possible causes of hyperglycemia    Inadequate treatment plan for diabetes     Being sick    Being under stress    Taking certain medicines, such as steroids    Eating too much food, especially carbohydrates    Being less active than usual    Not taking enough diabetes medicine  Symptoms of hyperglycemia  Hyperglycemia may not cause symptoms. If you do have symptoms, they may include:    Thirst    Frequent need to urinate    Feeling tired    Nausea and vomiting    Itchy, dry skin    Blurry vision    Fast breathing and breath that smells fruity     Weakness    Dizziness    Wounds or skin infections that don t heal    Unexplained weight loss if hyperglycemia lasts for more than a few days   What you should do  Make sure you do the following:    Check your blood sugar.    Drink plenty of sugar-free, caffeine-free liquids such as water. Don t drink fruit juice.    Check your blood sugar again every 4 hours. If you take insulin or diabetes medicines, follow your sick-day plan for taking medicine. Call your healthcare provider if you are not able to eat.    Check your blood or urine for ketones as directed.    Call your healthcare provider if your blood sugar and ketones do not return to your  target range.  Preventing high blood sugar  To help keep your blood sugar from getting too high:    Control stress.    When you're ill, follow your sick-day plan.     Follow your meal plan. Eat only the amount of food on your meal plan    Follow your exercise plan.    Take your insulin or diabetes medicines as directed by your healthcare team. Also test your blood sugar as directed. If the plan is not working for you, discuss it with your healthcare provider.  Other things to do    Carry a medical ID card, a compact [employer] drive, or wear a medical alert bracelet or necklace. It should say that you have diabetes. It should also say what to do in case you pass out or go into a coma.    Make sure family, friends, and coworkers know the signs of high blood sugar. Tell them what to do if your blood sugar gets very high and you can t help yourself.    Talk to your healthcare team about other things you can do to prevent high blood sugar.   Special note: Drink plenty of sugar-free and caffeine-free liquids when you feel symptoms of hyperglycemia. Call your healthcare provider if you keep having episodes of hyperglycemia.   Date Last Reviewed: 5/1/2016 2000-2017 [employer]. [address] [place] [address], [place] [address]. All rights reserved. This information is not intended as a substitute for professional medical care. Always follow your healthcare professional's instructions.

## 2019-05-30 LAB — COLONOSCOPY: NORMAL

## 2021-06-08 LAB — MAMMOGRAM: NORMAL

## 2022-04-05 LAB
HPV ABSTRACT: NORMAL
PAP-ABSTRACT: NORMAL

## 2022-05-18 LAB
HEP C HIM: NORMAL
HIV 1+2 AB+HIV1 P24 AG SERPL QL IA: NON REACTIVE

## 2022-08-29 ENCOUNTER — OFFICE VISIT (OUTPATIENT)
Dept: FAMILY MEDICINE | Facility: CLINIC | Age: 57
End: 2022-08-29
Payer: COMMERCIAL

## 2022-08-29 VITALS
RESPIRATION RATE: 14 BRPM | HEIGHT: 69 IN | HEART RATE: 98 BPM | SYSTOLIC BLOOD PRESSURE: 145 MMHG | TEMPERATURE: 98 F | DIASTOLIC BLOOD PRESSURE: 99 MMHG | OXYGEN SATURATION: 100 % | WEIGHT: 293 LBS | BODY MASS INDEX: 43.4 KG/M2

## 2022-08-29 DIAGNOSIS — M25.512 PAIN IN JOINT OF LEFT SHOULDER: ICD-10-CM

## 2022-08-29 DIAGNOSIS — M89.8X1 PAIN OF LEFT CLAVICLE: ICD-10-CM

## 2022-08-29 DIAGNOSIS — N64.4 BREAST PAIN: ICD-10-CM

## 2022-08-29 DIAGNOSIS — Z12.31 ENCOUNTER FOR SCREENING MAMMOGRAM FOR BREAST CANCER: ICD-10-CM

## 2022-08-29 DIAGNOSIS — Z00.00 ROUTINE HISTORY AND PHYSICAL EXAMINATION OF ADULT: ICD-10-CM

## 2022-08-29 DIAGNOSIS — M79.7 FIBROMYALGIA: ICD-10-CM

## 2022-08-29 DIAGNOSIS — Z13.220 LIPID SCREENING: Primary | ICD-10-CM

## 2022-08-29 DIAGNOSIS — Z76.89 ENCOUNTER TO ESTABLISH CARE: ICD-10-CM

## 2022-08-29 DIAGNOSIS — G44.221 CHRONIC TENSION-TYPE HEADACHE, INTRACTABLE: ICD-10-CM

## 2022-08-29 DIAGNOSIS — F10.20 ALCOHOL USE DISORDER, SEVERE, DEPENDENCE (H): ICD-10-CM

## 2022-08-29 PROBLEM — K76.0 HEPATIC STEATOSIS: Status: ACTIVE | Noted: 2019-05-10

## 2022-08-29 PROBLEM — R23.2 HOT FLASHES: Status: ACTIVE | Noted: 2020-09-30

## 2022-08-29 PROBLEM — G47.00 INSOMNIA: Status: ACTIVE | Noted: 2022-08-29

## 2022-08-29 PROBLEM — I87.2 CHRONIC VENOUS INSUFFICIENCY: Status: ACTIVE | Noted: 2020-11-13

## 2022-08-29 PROBLEM — Z16.24 MULTIPLE DRUG RESISTANT ORGANISM (MDRO) CULTURE POSITIVE: Status: ACTIVE | Noted: 2021-05-14

## 2022-08-29 PROBLEM — L28.0 LICHEN SIMPLEX CHRONICUS: Status: ACTIVE | Noted: 2017-12-01

## 2022-08-29 PROBLEM — M19.90 OSTEOARTHROSIS: Status: ACTIVE | Noted: 2017-09-20

## 2022-08-29 PROBLEM — F14.10 COCAINE SUBSTANCE ABUSE (H): Status: ACTIVE | Noted: 2017-09-20

## 2022-08-29 PROBLEM — N39.0 RECURRENT UTI: Status: ACTIVE | Noted: 2019-09-25

## 2022-08-29 PROBLEM — G25.81 RESTLESS LEGS: Status: ACTIVE | Noted: 2021-11-22

## 2022-08-29 PROBLEM — K03.2 DENTAL EROSION: Status: ACTIVE | Noted: 2019-05-13

## 2022-08-29 PROBLEM — H04.123 DRY EYES, BILATERAL: Status: ACTIVE | Noted: 2018-05-24

## 2022-08-29 PROBLEM — K59.00 CONSTIPATION: Status: ACTIVE | Noted: 2021-02-15

## 2022-08-29 PROBLEM — N95.2 ATROPHIC VAGINITIS: Status: ACTIVE | Noted: 2020-04-13

## 2022-08-29 PROBLEM — R60.0 BILATERAL LEG EDEMA: Status: ACTIVE | Noted: 2019-06-03

## 2022-08-29 PROBLEM — R25.2 BILATERAL LEG CRAMPS: Status: ACTIVE | Noted: 2020-11-13

## 2022-08-29 PROBLEM — K56.50 SMALL BOWEL OBSTRUCTION DUE TO ADHESIONS (H): Status: ACTIVE | Noted: 2022-02-27

## 2022-08-29 PROBLEM — H26.9 CATARACT, RIGHT: Status: ACTIVE | Noted: 2019-09-19

## 2022-08-29 PROBLEM — K21.9 GASTROESOPHAGEAL REFLUX DISEASE: Status: ACTIVE | Noted: 2022-08-29

## 2022-08-29 PROBLEM — S99.192A CLOSED FRACTURE OF BASE OF FIFTH METATARSAL BONE OF LEFT FOOT AT METAPHYSEAL-DIAPHYSEAL JUNCTION: Status: ACTIVE | Noted: 2019-10-07

## 2022-08-29 PROBLEM — G43.909 MIGRAINE: Status: ACTIVE | Noted: 2021-03-10

## 2022-08-29 PROBLEM — L73.2 HYDRADENITIS: Status: ACTIVE | Noted: 2017-06-15

## 2022-08-29 PROBLEM — F41.9 ANXIETY: Status: ACTIVE | Noted: 2021-02-15

## 2022-08-29 LAB
CHOLEST SERPL-MCNC: 209 MG/DL
HDLC SERPL-MCNC: 58 MG/DL
LDLC SERPL CALC-MCNC: 86 MG/DL
NONHDLC SERPL-MCNC: 151 MG/DL
TRIGL SERPL-MCNC: 327 MG/DL

## 2022-08-29 PROCEDURE — 80061 LIPID PANEL: CPT | Performed by: FAMILY MEDICINE

## 2022-08-29 RX ORDER — DOCUSATE SODIUM 100 MG/1
100 CAPSULE, LIQUID FILLED ORAL 2 TIMES DAILY PRN
COMMUNITY

## 2022-08-29 RX ORDER — ACETAMINOPHEN 500 MG
500 TABLET ORAL 2 TIMES DAILY
COMMUNITY
Start: 2021-05-11

## 2022-08-29 RX ORDER — CHLORTHALIDONE 50 MG/1
25 TABLET ORAL DAILY
COMMUNITY

## 2022-08-29 RX ORDER — INSULIN ASPART 100 [IU]/ML
5 INJECTION, SOLUTION INTRAVENOUS; SUBCUTANEOUS
COMMUNITY

## 2022-08-29 RX ORDER — FESOTERODINE FUMARATE 4 MG/1
4 TABLET, FILM COATED, EXTENDED RELEASE ORAL DAILY
COMMUNITY

## 2022-08-29 RX ORDER — POLYETHYLENE GLYCOL 3350
17 POWDER (GRAM) MISCELLANEOUS DAILY PRN
COMMUNITY

## 2022-08-29 RX ORDER — ALBUTEROL SULFATE 90 UG/1
1-2 AEROSOL, METERED RESPIRATORY (INHALATION) EVERY 6 HOURS PRN
COMMUNITY

## 2022-08-29 RX ORDER — HYDROXYZINE HYDROCHLORIDE 25 MG/1
25 TABLET, FILM COATED ORAL 3 TIMES DAILY
COMMUNITY

## 2022-08-29 RX ORDER — PRAMIPEXOLE DIHYDROCHLORIDE 0.25 MG/1
0.25 TABLET ORAL AT BEDTIME
COMMUNITY
End: 2022-10-19

## 2022-08-29 RX ORDER — ATORVASTATIN CALCIUM 80 MG/1
40 TABLET, FILM COATED ORAL AT BEDTIME
COMMUNITY

## 2022-08-29 RX ORDER — LIRAGLUTIDE 6 MG/ML
1.2 INJECTION SUBCUTANEOUS DAILY
COMMUNITY

## 2022-08-29 RX ORDER — OXYCODONE HYDROCHLORIDE 5 MG/1
5 TABLET ORAL EVERY 6 HOURS PRN
COMMUNITY
End: 2023-03-17

## 2022-08-29 RX ORDER — PRAZOSIN HYDROCHLORIDE 2 MG/1
4 CAPSULE ORAL AT BEDTIME
COMMUNITY

## 2022-08-29 RX ORDER — VARENICLINE TARTRATE 1 MG/1
1 TABLET, FILM COATED ORAL 2 TIMES DAILY
COMMUNITY
Start: 2020-09-30 | End: 2022-08-29

## 2022-08-29 RX ORDER — FAMOTIDINE 20 MG/1
20 TABLET, FILM COATED ORAL DAILY
COMMUNITY

## 2022-08-29 RX ORDER — LOSARTAN POTASSIUM 100 MG/1
100 TABLET ORAL DAILY
COMMUNITY

## 2022-08-29 RX ORDER — IBUPROFEN 600 MG/1
600 TABLET, FILM COATED ORAL 4 TIMES DAILY PRN
COMMUNITY
End: 2022-11-30

## 2022-08-29 RX ORDER — DIPHENHYDRAMINE HCL 25 MG
50 CAPSULE ORAL EVERY 4 HOURS PRN
COMMUNITY

## 2022-08-29 RX ORDER — POLYVINYL ALCOHOL 14 MG/ML
1 SOLUTION/ DROPS OPHTHALMIC 4 TIMES DAILY
COMMUNITY

## 2022-08-29 RX ORDER — SENNOSIDES A AND B 8.6 MG/1
1 TABLET, FILM COATED ORAL 2 TIMES DAILY
COMMUNITY

## 2022-08-29 RX ORDER — CARVEDILOL 25 MG/1
25 TABLET ORAL 2 TIMES DAILY WITH MEALS
COMMUNITY

## 2022-08-29 NOTE — PROGRESS NOTES
SUBJECTIVE:   CC: Terri Mullen is an 57 year old woman who presents for preventive health visit.     Patient has been advised of split billing requirements and indicates understanding: Yes     HPI  # Health Maintenance  - HIV Screening: no concerns  - STI Screening: no concerns  - Hep C Screening: no concerns  - BP:   BP Readings from Last 3 Encounters:   08/29/22 (!) 145/99   04/10/18 147/89   02/14/17 170/88   - Cholesterol: pending  - Last Pap: 4/2022 NIL; repeat in 2027  - Colonoscopy: negative in 2019, repeat in 2029  - Mammogram: negative in 2021, repeat in 2022    PROBLEMS TO ADD ON...  Previous primary care has been through [facility] with [facility]  Multiple ongoing issues for which Terri is hoping to transfer care. Of issue is the fact that most of my referrals would normally go through [facility] but Terri appears to be restricted to [facility].    Chronic Pain  - multiple issues  - history of poly substance abuse, alcohol, opiates, cocaine  - patient denies previous referral to pain clinic although she does report she was previously prescribed suboxone for pain management (while she was in rehab?)  - current med list does include oxycodone 5-10mg q6h    Diabetes  - current meds per patient records    - lantus 40 units QAM    - novolog 5 units TID with meals    - liraglutide 1.2mg daily  - most recent 10.4 in July of 2022    HTN  - current meds per chart    - carvedilol 25mg BID    - chlorthalidone 25mg daily    - losartan 100mg daily  - pressures today 145/99  - patient denies chest pain, shortness of breath, lightheadedness    HLD  - current meds per chart    - atorvastatin 40mg daily  - no recent lipid panel       Today's PHQ-2 Score: No flowsheet data found.    Social History     Tobacco Use     Smoking status: Current Every Day Smoker     Smokeless tobacco: Not on file   Substance Use Topics     Alcohol use: Yes     If you drink alcohol do you typically have >3 drinks per day or >7 drinks per week?  Yes      Reviewed orders with patient.  Reviewed health maintenance and updated orders accordingly - Yes  :     Reviewed and updated as needed this visit by clinical staff   Tobacco  Allergies  Meds  Problems  Med Hx  Surg Hx  Fam Hx  Soc   Hx          Reviewed and updated as needed this visit by Provider   Tobacco  Allergies  Meds  Problems  Med Hx  Surg Hx  Fam Hx           Past Medical History:   Diagnosis Date     Alcohol use disorder, severe, dependence (H) 12/8/2011    Formatting of this note is different from the original. 02/2016:  11/2015 Tx at [address], left 12/14/15, 2 days left.  Clean and sober since 11-16-15.  Engaged with [facility].  4/28/16 Had planned on intensive OP residential treatment, decided not to d/t takes care of nieces children.       Last Assessment & Plan:  Formatting of this note might be different from the original. Remains sober at      Arthritis      Asthma      Atrophic vaginitis 4/13/2020     Bilateral leg edema 6/3/2019    Formatting of this note might be different from the original. Last Assessment & Plan:  Formatting of this note might be different from the original. Significant edema that is non pitting of both extremities with 2 wounds. Looks like her primary provider has reached out to get Terri in for wounds care. Today her wounds were cleansed, covered with bacitracin and sterile dressing. Encouraged her to e     Breast pain 11/8/2018    Formatting of this note might be different from the original. Last Assessment & Plan:  Formatting of this note might be different from the original. Terri did recently have a mammogram that was ok, but will trust her feeling that something is not right even if she cannot articulate it well and will order a repeat mammogram. Encouraged hypoallergenic lotion for whole body including breasts for itch     Chronic tension-type headache, intractable 11/17/2011    Formatting of this note is different from the original. 5/25/2016  Neurology: no signs of a neurologic process, intracranial pathology not suspected.  HA even when taking Topamax (March-Oct 2015).  HA multifactorial, stress, diet/hydration, HTN.     Last Assessment & Plan:  Formatting of this note might be different from the original. Sees Dr. Mtz next week for neck pain which may be contribut     Chronic venous insufficiency 11/13/2020    Formatting of this note might be different from the original. Last Assessment & Plan:  Formatting of this note might be different from the original. Placing unna boots in clinic today. Encouraged to discuss issues with wound clinic at next visit. Last Assessment & Plan:  Formatting of this note might be different from the original. Placing unna boots in clinic today. Encouraged to discuss issues w     Closed fracture of base of fifth metatarsal bone of left foot at metaphyseal-diaphyseal junction 10/7/2019    Formatting of this note might be different from the original. Last Assessment & Plan:  Formatting of this note might be different from the original. Has changed her mind from ED visit and would now like rehab. Will work with team to try to connect to TCU and reach out to Terri if we have an update. Last Assessment & Plan:  Formatting of this note might be different from the original. Has changed h     Cocaine substance abuse (H) 9/20/2017     Constipation 2/15/2021     Diabetes mellitus (H)      Dyslipidemia 8/20/2015    Formatting of this note might be different from the original. 06/2016             L=76  H=35 10/2012 C=188, L=81, H=58, T=245  ASCVD 10 yr risk is 21.5%.  On Atorvastatin 40 mg Formatting of this note might be different from the original. Formatting of this note might be different from the original. 06/2016             L=76  H=35 10/2012 C=188, L=81, H=58, T=245  ASCVD 10 yr risk is 21.5%.  On C[place]rick     Dyspnea on exertion 10/1/2012    Formatting of this note might be different from the original. Largely due to weight and  deconditioning. See pulmonology note from 6/7/2019 for full work up description.   Last Assessment & Plan:  Formatting of this note might be different from the original. Based on pulmonology work up, dyspnea on exertion likely related to weight and deconditioning rather than asthma or COPD, therefore discontinu     Fibromyalgia 9/20/2017    Formatting of this note might be different from the original. Lyrica not covered due to intermittent filling of script (7/12 months), must try Duloxetine first.  11/2013 Opioid oversight, narcotics discontinued d/t EtOH use.   08/2015  Cymbalta 60 mg daily  Last Assessment & Plan:  Formatting of this note might be different from the original. Terri asked to be transferred to the  to try      Gastroesophageal reflux disease 8/29/2022     Greater trochanteric bursitis of both hips 3/22/2013    Formatting of this note might be different from the original. 03/2013 injections in BL hips Formatting of this note might be different from the original. Formatting of this note might be different from the original. 03/2013 injections in BL hips     Hepatic steatosis 5/10/2019    Formatting of this note might be different from the original. Last Assessment & Plan:  Formatting of this note might be different from the original. Foot care provided- nails trimmed, dystrophic nails sanded with Dremel, foot massage and new socks provided.  Formatting of this note might be different from the original. Per abdominal ultrasound September 2018  Last Assessment & Plan:  Formatting of     History of alcohol abuse 12/8/2011    Formatting of this note is different from the original. Overview:  02/2016:  11/2015 Tx at [address], left 12/14/15, 2 days left.  Clean and sober since 11-16-15.  Engaged with [facility].  4/28/16 Had planned on intensive OP residential treatment, decided not to d/t takes care of nieces children.      Last Assessment & Plan:  A: Pt endorses drinking a 12 pkg of beer on  Friday, no plans to quit      Hydradenitis 6/15/2017    Last Assessment & Plan:  Formatting of this note might be different from the original. Terri reports having gotten hydradenitis suppurativa about 7 years ago that opened and left a wound. She has not had any episodes since then but appears to have 4 new nodules today. Given her uncontrolled DM, after discussing with other providers in the clinic, I am prescribing a 7 day supply of Bactrim. We disc     Hypertension      Lichen simplex chronicus 12/1/2017    Formatting of this note might be different from the original. 5/31/17 obgyn visit: Biopsy shows Lichen Simplex Chronicus 2/2 chronic eczematicus dermatitis.  - f/u with OB/gyn clinic in 2-3 weeks - betamethasone qhs to irritated arean x 2 weeks (clobetasol not covered by insurance, therefore juan luis recommended this plan)  Last Assessment & Plan:  Formatting of this note might be different from t     Major depressive disorder, recurrent severe without psychotic features (H) 11/14/2011    Last Assessment & Plan:  Formatting of this note might be different from the original. Can work with LP in clinic again if interested. Seems stable at this time.     Microcytic anemia 6/22/2016    Formatting of this note is different from the original. 06/2016 Hgb = 10.9.    H/O anemia dating to 2011, baseline Hgb = 11.0. On 8/3/15 Hgb 10.6.   Iron studies c/w mixed, ACD/SUZIE.  Pt sober since 11-16-15.    Last Assessment & Plan:  Formatting of this note might be different from the original. Recent dip in hemoglobin. Colonoscopy is scheduled, will order egd as well.  Formatting of this note m     Migraine 3/10/2021     Morbid obesity (H) 11/9/2010    Last Assessment & Plan:  Formatting of this note might be different from the original. Unfortunately affecting breathing and pain. Recommend working with Cam Patty on binge eating. Formatting of this note might be different from the original. Last Assessment & Plan:  Formatting  "of this note might be different from the original. HgbA1c amazing today! Working on treating apnea to reduce edema as mu     Multiple drug resistant organism (MDRO) culture positive 5/14/2021     JUSTEN (obstructive sleep apnea) 7/7/2016    Formatting of this note might be different from the original. Prescribed CPAP 10 cmH20 after 6/27/2019 PSG.  Last Assessment & Plan:  Formatting of this note might be different from the original. Encouraged trying CPAP again, letting us know what issues come up so we can try to trouble shoot. Reviewed all the ways poorly treated apnea can affect her health. Formatting of this note might be differe     Osteoarthrosis 9/20/2017     Pain in joint of left shoulder 3/10/2021     Pain of left clavicle 11/11/2019    Formatting of this note might be different from the original. Last Assessment & Plan:  Formatting of this note might be different from the original. Has complained of pain in left clavicle for 1 year now. Says it is worsening. Imaging a year ago were reassuring. Suspect related to left shoulder osteoarthritis but will reassess with imaging today. Last Assessment & Plan:  Formatting of this note mi     Posttraumatic stress disorder 11/14/2011    Last Assessment & Plan:  Formatting of this note might be different from the original. Mood seems improved today, possibly from hope about treatment but also proud of herself regarding weight loss (see \"obesity\"). Still having some insomnia at night. Discussed as team and will put her back on small dose of Seroquel PRN at bedtime to help her get some sleep, especially as she starts treatment, so t     Recurrent UTI 9/25/2019    Formatting of this note might be different from the original. Last Assessment & Plan:  Formatting of this note might be different from the original. Urine testing today to rule out infectious etiology of symptoms due to report of bad odor and dysuria. Pyridium for 3 days PRN. Last Assessment & Plan:  Formatting of " "this note might be different from the original. Reviewed preventive measures. Sendin     Restless legs 11/22/2021    Last Assessment & Plan:  Formatting of this note might be different from the original. Sx suggestive of restless legs.  Will check iron (ferritin).  Would consider trial of pramipexole.     Small bowel obstruction due to adhesions (H) 2/27/2022     Vertigo       History reviewed. No pertinent surgical history.    Review of Systems   ROS: 10 point ROS neg other than the symptoms noted above in the HPI.       OBJECTIVE:   BP (!) 145/99 (BP Location: Right arm, Patient Position: Sitting, Cuff Size: Adult Regular)   Pulse 98   Temp 98  F (36.7  C) (Skin)   Resp 14   Ht 1.753 m (5' 9\")   Wt (!) 156.8 kg (345 lb 12 oz)   SpO2 100%   BMI 51.06 kg/m    Physical Exam  GENERAL: Morbid obesity  NECK: no adenopathy, no asymmetry, masses, or scars and thyroid normal to palpation  RESP: lungs clear to auscultation - no rales, rhonchi or wheezes  CV: regular rate and rhythm, normal S1 S2, no S3 or S4, no murmur, click or rub, no peripheral edema and peripheral pulses strong  ABDOMEN: soft, nontender, no hepatosplenomegaly, no masses and bowel sounds normal  MS: uses scooter for ambulation, minimal ability to walk on her own    Diagnostic Test Results:  Labs reviewed in Epic    ASSESSMENT/PLAN:   Terri was seen today for establish care, chronic pain and restless legs.    Diagnoses and all orders for this visit:    Lipid screening  -     Lipid Profile; Future  -     Lipid Profile    Encounter for screening mammogram for breast cancer  -     Mammogram - routine screening; Future    Fibromyalgia  -     Pain Management  Referral; Future    Chronic tension-type headache, intractable  -     Pain Management  Referral; Future    Breast pain  -     Pain Management  Referral; Future    Alcohol use disorder, severe, dependence (H)  -     Pain Management  Referral; Future    Pain in " "joint of left shoulder  -     Pain Management  Referral; Future    Pain of left clavicle  -     Pain Management  Referral; Future    Encounter to establish care    Routine history and physical examination of adult    Referrals made today as noted. Unclear if any referral I make will be effective given Terri's restricted status. Plan will be to continue seeing Terri with some regularity until we have made our way through her medical issues.     I would also like to ask Malgorzata Thibodeaux, PharmD to possibly meet with Terri and also get in touch with Terri's nursing home to go over her active medications. There appears to be some discrepancies in terms of previous medications no longer being administered because they are not delivered through [facility].     Patient has been advised of split billing requirements and indicates understanding: Yes    COUNSELING:  Reviewed preventive health counseling, as reflected in patient instructions    Estimated body mass index is 53.16 kg/m  as calculated from the following:    Height as of 4/10/18: 1.753 m (5' 9\").    Weight as of 4/10/18: 163.3 kg (360 lb).    Weight management plan: Discussed healthy diet and exercise guidelines    She reports that she has been smoking. She does not have any smokeless tobacco history on file.  Nicotine/Tobacco Cessation Plan:   Information offered: Patient not interested at this time      Counseling Resources:  ATP IV Guidelines  Pooled Cohorts Equation Calculator  Breast Cancer Risk Calculator  BRCA-Related Cancer Risk Assessment: FHS-7 Tool  FRAX Risk Assessment  ICSI Preventive Guidelines  Dietary Guidelines for Americans, 2010  USDA's MyPlate  ASA Prophylaxis  Lung CA Screening    Arben Horta MD  [facility]  "

## 2022-08-29 NOTE — LETTER
August 30, 2022      Terri Mullen  [address]  [facility] [address]        Aileen Murray,     Here are the results from your recent labs. Everything looks good. I have no concerns based on these results. Feel free to contact me here if you have any questions.     Best Wishes,     Arben Horta MD   10:28 AM, August 30, 2022         Resulted Orders   Lipid Profile   Result Value Ref Range    Cholesterol 209 (H) <200 mg/dL    Triglycerides 327 (H) <150 mg/dL    Direct Measure HDL 58 >=50 mg/dL    LDL Cholesterol Calculated 86 <=100 mg/dL    Non HDL Cholesterol 151 (H) <130 mg/dL    Narrative    Cholesterol  Desirable:  <200 mg/dL    Triglycerides  Normal:  Less than 150 mg/dL  Borderline High:  150-199 mg/dL  High:  200-499 mg/dL  Very High:  Greater than or equal to 500 mg/dL    Direct Measure HDL  Female:  Greater than or equal to 50 mg/dL   Male:  Greater than or equal to 40 mg/dL    LDL Cholesterol  Desirable:  <100mg/dL  Above Desirable:  100-129 mg/dL   Borderline High:  130-159 mg/dL   High:  160-189 mg/dL   Very High:  >= 190 mg/dL    Non HDL Cholesterol  Desirable:  130 mg/dL  Above Desirable:  130-159 mg/dL  Borderline High:  160-189 mg/dL  High:  190-219 mg/dL  Very High:  Greater than or equal to 220 mg/dL

## 2022-08-29 NOTE — NURSING NOTE
"    57 year old  Chief Complaint   Patient presents with     hospitals Care     Chronic pain     Restless Legs       Blood pressure (!) 145/99, pulse 98, temperature 98  F (36.7  C), temperature source Skin, resp. rate 14, height 1.753 m (5' 9\"), weight (!) 156.8 kg (345 lb 12 oz), SpO2 100 %. Body mass index is 51.06 kg/m .  There is no problem list on file for this patient.      Wt Readings from Last 2 Encounters:   08/29/22 (!) 156.8 kg (345 lb 12 oz)   04/10/18 (!) 163.3 kg (360 lb)     BP Readings from Last 3 Encounters:   08/29/22 (!) 145/99   04/10/18 147/89   02/14/17 170/88         Current Outpatient Medications   Medication     acetaminophen (TYLENOL) 500 MG tablet     atorvastatin (LIPITOR) 80 MG tablet     Blood Glucose Monitoring Suppl MISC     carvedilol (COREG) 25 MG tablet     chlorthalidone (HYGROTON) 50 MG tablet     mometasone-formoterol (DULERA) 100-5 MCG/ACT inhaler     polyvinyl alcohol (ARTIFICIAL TEARS) 1.4 % ophthalmic solution     Skin Protectants, Misc. (EUCERIN) cream     varenicline (CHANTIX) 1 MG tablet     albuterol-ipratropium (COMBIVENT)  MCG/ACT inhaler     amLODIPine (NORVASC) 5 MG tablet     AMLODIPINE BESYLATE PO     ASPIRIN PO     CITALOPRAM HYDROBROMIDE PO     HYDROcodone-acetaminophen (NORCO) 5-325 MG per tablet     LISINOPRIL PO     meclizine (ANTIVERT) 12.5 MG tablet     METOPROLOL SUCCINATE PO     NAPROXEN PO     NORTRIPTYLINE HCL PO     Omega-3 Fatty Acids (OMEGA-3 FISH OIL PO)     OMEPRAZOLE PO     SIMVASTATIN PO     SULINDAC PO     TRAZODONE HCL PO     No current facility-administered medications for this visit.       Social History     Tobacco Use     Smoking status: Former Smoker     Smokeless tobacco: Never Used   Vaping Use     Vaping Use: Never used   Substance Use Topics     Alcohol use: Yes     Alcohol/week: 30.0 standard drinks     Types: 15 Cans of beer, 15 Standard drinks or equivalent per week     Drug use: Yes     Types: \"Crack\" cocaine     Comment: 3 " times a month       Health Maintenance Due   Topic Date Due     PREVENTIVE CARE VISIT  Never done     ADVANCE CARE PLANNING  Never done     MAMMO SCREENING  Never done     COLORECTAL CANCER SCREENING  Never done     HIV SCREENING  Never done     HEPATITIS C SCREENING  Never done     PAP  Never done     LIPID  Never done     LUNG CANCER SCREENING  Never done     ZOSTER IMMUNIZATION (2 of 2) 03/12/2021     DTAP/TDAP/TD IMMUNIZATION (2 - Td or Tdap) 11/01/2021     COVID-19 Vaccine (3 - Booster for Moderna series) 11/11/2021     PHQ-2 (once per calendar year)  Never done     INFLUENZA VACCINE (1) 09/01/2022       No results found for: PAP      August 29, 2022 3:38 PM

## 2022-08-29 NOTE — Clinical Note
Franky Mcgarry, I met with Terri for the first time today. She is a very complicated patient with care through multiple systems and a very limited understanding of what medications she is actually taking. Very reminiscent of Lalo! I would love to speak with you about her when you have time.  Thanks, Arben

## 2022-09-20 PROBLEM — E11.9 DIABETES MELLITUS, TYPE 2 (H): Status: ACTIVE | Noted: 2022-09-20

## 2022-09-20 NOTE — PROGRESS NOTES
Assessment & Plan   Problem List Items Addressed This Visit        Endocrine    Diabetes mellitus, type 2 (H) - Primary    Relevant Orders    Comprehensive metabolic panel    Hemoglobin A1c    Lipid Profile    Protein  random urine       Musculoskeletal and Integumentary    Bilateral leg cramps    Relevant Medications    diclofenac (VOLTAREN) 1 % topical gel      Other Visit Diagnoses     Need for immunization against influenza        Relevant Orders    INFLUENZA VACCINE IM > 6 MONTHS VALENT IIV4 (AFLURIA/FLUZONE) (Completed)    Need for diphtheria-tetanus-pertussis (Tdap) vaccine        Relevant Orders    TDAP (ADACEL,BOOSTRIX) (Completed)         Primary focus on diabetes management today. Terri reports her AM sugars are typically high 200s to low 300s. Will increase lantus dose to 44 units every AM but expect will have to increase beyond this as well as possibly increasing mealtime insulin. I have asked Terri to start checking post prandial sugars to assess for possible Novolog dosing.     I will also send a message to pharmacy and dietician to discuss further strategies for controlling sugars.     Warm hand off with clinic [facility] today to discuss possible help with loneliness and to help motivate patient with life changes.     Agreed to Voltaren gel Rx today for worsening calf pain and back spasms. Encouraged Terri to keep her upcoming appointment with Pain clinic for comprehensive pain management.     38 minutes spent on the date of the encounter doing chart review, history and exam, documentation and further activities as noted.    Arben Horta MD  [facility]    Subjective   Terri is a 57 year old presenting for the following health issues:  Diabetes      HPI   # Type 2 Diabetes Mellitus  Outside records A1c   - 7/7/22  10.4  - 2/22/22 10.5  - 11/22/21 7.7  Lab Results   Component Value Date    CR 0.93 04/10/2018   No results found for: UMALSP, UMALCR, UCRR  - Current Regimen:     - novolog 5 units,  TID with meals    - lantus 40 units every morning    - liraglutide 1.2 mg daily  - Missed doses: occasionally misses mealtime insulin, particularly in the evenings  - Self monitoring blood gluose?: yes  - Hypoglycemic episodes?: no    Wt Readings from Last 5 Encounters:   08/29/22 (!) 156.8 kg (345 lb 12 oz)   04/10/18 (!) 163.3 kg (360 lb)   02/14/17 (!) 158.3 kg (349 lb)   12/24/16 (!) 163.3 kg (360 lb)   08/02/16 (!) 157.9 kg (348 lb)   - Exercise: minimal, Terri is mostly wheelchair bound with chronic leg pain  - Last eye exam: within the last half year  - Last foot exam: today  - Last dental exam: biannual     Recent Labs   Lab Test 08/29/22  1621   CHOL 209*   HDL 58   LDL 86   TRIG 327*   - History of ASCVD?: no  - On ASA and statin?: ASA no, statin yes    Review of Systems   Constitutional, HEENT, cardiovascular, pulmonary, gi and gu systems are negative, except as otherwise noted.      Objective    /89 (BP Location: Left arm, Patient Position: Sitting, Cuff Size: Adult Large)   Pulse 111   Temp 98.1  F (36.7  C) (Skin)   Resp 15   SpO2 100%   There is no height or weight on file to calculate BMI.  Physical Exam   GENERAL: morbidly obese, alert and no distress  NECK: no adenopathy, no asymmetry, masses, or scars and thyroid normal to palpation  RESP: lungs clear to auscultation - no rales, rhonchi or wheezes  CV: regular rate and rhythm, normal S1 S2, no S3 or S4, no murmur, click or rub, no peripheral edema and peripheral pulses strong  ABDOMEN: soft, nontender, no masses and bowel sounds normal  MS: mostly wheelchair bound, no gross musculoskeletal defects noted, no edema

## 2022-09-21 ENCOUNTER — OFFICE VISIT (OUTPATIENT)
Dept: FAMILY MEDICINE | Facility: CLINIC | Age: 57
End: 2022-09-21
Payer: COMMERCIAL

## 2022-09-21 ENCOUNTER — OFFICE VISIT (OUTPATIENT)
Dept: BEHAVIORAL HEALTH | Facility: CLINIC | Age: 57
End: 2022-09-21

## 2022-09-21 VITALS
RESPIRATION RATE: 15 BRPM | OXYGEN SATURATION: 100 % | HEART RATE: 111 BPM | DIASTOLIC BLOOD PRESSURE: 89 MMHG | SYSTOLIC BLOOD PRESSURE: 129 MMHG | TEMPERATURE: 98.1 F

## 2022-09-21 DIAGNOSIS — Z23 NEED FOR IMMUNIZATION AGAINST INFLUENZA: ICD-10-CM

## 2022-09-21 DIAGNOSIS — E11.9 TYPE 2 DIABETES MELLITUS WITHOUT COMPLICATION, UNSPECIFIED WHETHER LONG TERM INSULIN USE (H): Primary | ICD-10-CM

## 2022-09-21 DIAGNOSIS — R25.2 BILATERAL LEG CRAMPS: ICD-10-CM

## 2022-09-21 DIAGNOSIS — Z23 NEED FOR DIPHTHERIA-TETANUS-PERTUSSIS (TDAP) VACCINE: ICD-10-CM

## 2022-09-21 DIAGNOSIS — F43.20 ADJUSTMENT DISORDER, UNSPECIFIED TYPE: Primary | ICD-10-CM

## 2022-09-21 LAB
ALBUMIN MFR UR ELPH: 148 MG/DL
ALBUMIN SERPL BCG-MCNC: 4.5 G/DL (ref 3.5–5.2)
ALP SERPL-CCNC: 103 U/L (ref 35–104)
ALT SERPL W P-5'-P-CCNC: 21 U/L (ref 10–35)
ANION GAP SERPL CALCULATED.3IONS-SCNC: 15 MMOL/L (ref 7–15)
AST SERPL W P-5'-P-CCNC: 24 U/L (ref 10–35)
BILIRUB SERPL-MCNC: 0.2 MG/DL
BUN SERPL-MCNC: 24 MG/DL (ref 6–20)
CALCIUM SERPL-MCNC: 7.9 MG/DL (ref 8.6–10)
CHLORIDE SERPL-SCNC: 101 MMOL/L (ref 98–107)
CHOLEST SERPL-MCNC: 241 MG/DL
CREAT SERPL-MCNC: 0.8 MG/DL (ref 0.51–0.95)
CREAT UR-MCNC: 130 MG/DL
DEPRECATED HCO3 PLAS-SCNC: 21 MMOL/L (ref 22–29)
GFR SERPL CREATININE-BSD FRML MDRD: 86 ML/MIN/1.73M2
GLUCOSE SERPL-MCNC: 190 MG/DL (ref 70–99)
HBA1C MFR BLD: 9 % (ref 0–5.6)
HDLC SERPL-MCNC: 54 MG/DL
LDLC SERPL CALC-MCNC: ABNORMAL MG/DL
NONHDLC SERPL-MCNC: 187 MG/DL
POTASSIUM SERPL-SCNC: 3.9 MMOL/L (ref 3.4–5.3)
PROT SERPL-MCNC: 8 G/DL (ref 6.4–8.3)
PROT/CREAT 24H UR: 1.14 MG/MG CR (ref 0–0.2)
SODIUM SERPL-SCNC: 137 MMOL/L (ref 136–145)
TRIGL SERPL-MCNC: 466 MG/DL

## 2022-09-21 PROCEDURE — 84156 ASSAY OF PROTEIN URINE: CPT | Performed by: FAMILY MEDICINE

## 2022-09-21 PROCEDURE — 80053 COMPREHEN METABOLIC PANEL: CPT | Performed by: FAMILY MEDICINE

## 2022-09-21 PROCEDURE — 80061 LIPID PANEL: CPT | Performed by: FAMILY MEDICINE

## 2022-09-21 RX ORDER — AMMONIUM LACTATE 12 G/100G
CREAM TOPICAL
COMMUNITY
Start: 2022-09-12 | End: 2023-03-17

## 2022-09-21 RX ORDER — CALCIUM CARBONATE 500(1250)
500 TABLET,CHEWABLE ORAL
COMMUNITY
Start: 2022-09-12

## 2022-09-21 RX ORDER — VARENICLINE TARTRATE 1 MG/1
1 TABLET, FILM COATED ORAL
COMMUNITY

## 2022-09-21 RX ORDER — LIDOCAINE/PRILOCAINE 2.5 %-2.5%
CREAM (GRAM) TOPICAL
COMMUNITY
Start: 2021-05-11 | End: 2022-10-19

## 2022-09-21 ASSESSMENT — PATIENT HEALTH QUESTIONNAIRE - PHQ9
5. POOR APPETITE OR OVEREATING: NEARLY EVERY DAY
SUM OF ALL RESPONSES TO PHQ QUESTIONS 1-9: 27

## 2022-09-21 ASSESSMENT — ANXIETY QUESTIONNAIRES
GAD7 TOTAL SCORE: 21
GAD7 TOTAL SCORE: 21
7. FEELING AFRAID AS IF SOMETHING AWFUL MIGHT HAPPEN: NEARLY EVERY DAY
6. BECOMING EASILY ANNOYED OR IRRITABLE: NEARLY EVERY DAY
3. WORRYING TOO MUCH ABOUT DIFFERENT THINGS: NEARLY EVERY DAY
5. BEING SO RESTLESS THAT IT IS HARD TO SIT STILL: NEARLY EVERY DAY
IF YOU CHECKED OFF ANY PROBLEMS ON THIS QUESTIONNAIRE, HOW DIFFICULT HAVE THESE PROBLEMS MADE IT FOR YOU TO DO YOUR WORK, TAKE CARE OF THINGS AT HOME, OR GET ALONG WITH OTHER PEOPLE: EXTREMELY DIFFICULT
1. FEELING NERVOUS, ANXIOUS, OR ON EDGE: NEARLY EVERY DAY
2. NOT BEING ABLE TO STOP OR CONTROL WORRYING: NEARLY EVERY DAY

## 2022-09-21 NOTE — NURSING NOTE
"Injectable Influenza Immunization Documentation    1.  Has the patient received the information for the injectable influenza vaccine? YES     2. Is the patient 6 months of age or older? YES     3. Does the patient have any of the following contraindications?         Severe allergy to eggs? No     Severe allergic reaction to previous influenza vaccines? No   Severe allergy to latex? No       History of Guillain-[place] syndrome? No     Currently have a temperature greater than 100.4F? No        4.  Severely egg allergic patients should have flu vaccine eligibility assessed by an MD, RN, or pharmacist, and those who received flu vaccine should be observed for 15 min by an MD, RN, Pharmacist, Medical Technician, or member of clinic staff.\": YES    5. Latex-allergic patients should be given latex-free influenza vaccine Yes. Please reference the Vaccine latex table to determine if your clinic s product is latex-containing.       Vaccination given by Khalida Bowman CMA,[facility]  September 21, 2022 10:29 AM                "

## 2022-09-21 NOTE — NURSING NOTE
57 year old  Chief Complaint   Patient presents with     Diabetes       Blood pressure 129/89, pulse 111, temperature 98.1  F (36.7  C), temperature source Skin, resp. rate 15, SpO2 100 %. There is no height or weight on file to calculate BMI.  Patient Active Problem List   Diagnosis     Alcohol use disorder, severe, dependence (H)     Microcytic anemia     Anxiety     Atrophic vaginitis     Bilateral leg cramps     Bilateral leg edema     Breast pain     Cataract, right     Fibromyalgia     Chronic tension-type headache, intractable     Chronic venous insufficiency     Closed fracture of base of fifth metatarsal bone of left foot at metaphyseal-diaphyseal junction     Cocaine substance abuse (H)     Constipation     Dental erosion     Dry eyes, bilateral     Dyslipidemia     Dyspnea on exertion     Gastroesophageal reflux disease     Greater trochanteric bursitis of both hips     Hepatic steatosis     History of alcohol abuse     Hot flashes     Hydradenitis     Insomnia     Lichen simplex chronicus     Major depressive disorder, recurrent severe without psychotic features (H)     Migraine     Morbid obesity (H)     Multiple drug resistant organism (MDRO) culture positive     Myopia of both eyes with astigmatism and presbyopia     JUSTEN (obstructive sleep apnea)     Osteoarthrosis     Pain in joint of left shoulder     Pain of left clavicle     Posttraumatic stress disorder     Recurrent UTI     Restless legs     Small bowel obstruction due to adhesions (H)     Diabetes mellitus, type 2 (H)       Wt Readings from Last 2 Encounters:   08/29/22 (!) 156.8 kg (345 lb 12 oz)   04/10/18 (!) 163.3 kg (360 lb)     BP Readings from Last 3 Encounters:   09/21/22 129/89   08/29/22 (!) 145/99   04/10/18 147/89         Current Outpatient Medications   Medication     acetaminophen (TYLENOL) 500 MG tablet     albuterol (PROAIR HFA/PROVENTIL HFA/VENTOLIN HFA) 108 (90 Base) MCG/ACT inhaler     atorvastatin (LIPITOR) 80 MG tablet      "Blood Glucose Monitoring Suppl MISC     carvedilol (COREG) 25 MG tablet     chlorthalidone (HYGROTON) 50 MG tablet     diphenhydrAMINE (BENADRYL) 25 MG capsule     docusate sodium (COLACE) 100 MG capsule     famotidine (PEPCID) 20 MG tablet     fesoterodine fumarate (TOVIAZ) 4 MG TB24 24 hr tablet     hydrOXYzine (ATARAX) 25 MG tablet     ibuprofen (ADVIL/MOTRIN) 600 MG tablet     insulin aspart (NOVOLOG FLEXPEN) 100 UNIT/ML pen     insulin glargine (LANTUS PEN) 100 UNIT/ML pen     liraglutide (VICTOZA) 18 MG/3ML solution     losartan (COZAAR) 100 MG tablet     melatonin 5 MG tablet     mometasone-formoterol (DULERA) 100-5 MCG/ACT inhaler     naloxone (NARCAN) 4 MG/0.1ML nasal spray     OMEPRAZOLE PO     oxyCODONE (ROXICODONE) 5 MG tablet     polyethylene glycol 3350 POWD     polyvinyl alcohol (LIQUIFILM TEARS) 1.4 % ophthalmic solution     pramipexole (MIRAPEX) 0.25 MG tablet     prazosin (MINIPRESS) 2 MG capsule     senna (SENOKOT) 8.6 MG tablet     Skin Protectants, Misc. (EUCERIN) cream     No current facility-administered medications for this visit.       Social History     Tobacco Use     Smoking status: Former Smoker     Smokeless tobacco: Never Used   Vaping Use     Vaping Use: Never used   Substance Use Topics     Alcohol use: Yes     Alcohol/week: 30.0 standard drinks     Types: 15 Cans of beer, 15 Standard drinks or equivalent per week     Drug use: Yes     Types: \"Crack\" cocaine     Comment: 3 times a month       Health Maintenance Due   Topic Date Due     A1C  Never done     MICROALBUMIN  Never done     DIABETIC FOOT EXAM  Never done     ADVANCE CARE PLANNING  Never done     DEPRESSION ACTION PLAN  Never done     EYE EXAM  Never done     HEPATITIS B IMMUNIZATION (1 of 3 - Risk 3-dose series) Never done     LUNG CANCER SCREENING  Never done     BMP  04/10/2019     ZOSTER IMMUNIZATION (2 of 2) 03/12/2021     COVID-19 Vaccine (3 - Booster for Moderna series) 08/06/2021     DTAP/TDAP/TD IMMUNIZATION (2 - Td " or Tdap) 11/01/2021     INFLUENZA VACCINE (1) 09/01/2022       No results found for: PAP      September 21, 2022 9:27 AM

## 2022-09-21 NOTE — PROGRESS NOTES
Patient had appointment with his/her primary care physician. [facility] services were offered. No immediate safety/risk issues were reported or identified.  Explained the role of the [facility] and provided contact information for the [facility].  Pt denied having questions and affirmed ability to schedule services.     Shawn G. Ullrich, [facility], Behavioral Health Clinician

## 2022-09-21 NOTE — LETTER
September 22, 2022      Terri LEONARDO Mullen  [address]  [facility] [address]        Aileen Tammy,     Here are the results from your recent labs. For the most part, I think things look OK.     Your blood sugars looked much better than they did back in July. Congratulations! I would still like you to increase your lantus like we talked about, and I have asked our pharmacist and dietician here in clinic to contact you to meet with them. They are both wonderful people!     Feel free to contact me here at the clinic if you have any questions.     Arben Whitaker MD   9:38 AM, September 22, 2022       Resulted Orders   Comprehensive metabolic panel   Result Value Ref Range    Sodium 137 136 - 145 mmol/L    Potassium 3.9 3.4 - 5.3 mmol/L    Chloride 101 98 - 107 mmol/L    Carbon Dioxide (CO2) 21 (L) 22 - 29 mmol/L    Anion Gap 15 7 - 15 mmol/L    Urea Nitrogen 24.0 (H) 6.0 - 20.0 mg/dL    Creatinine 0.80 0.51 - 0.95 mg/dL    Calcium 7.9 (L) 8.6 - 10.0 mg/dL    Glucose 190 (H) 70 - 99 mg/dL    Alkaline Phosphatase 103 35 - 104 U/L    AST 24 10 - 35 U/L    ALT 21 10 - 35 U/L    Protein Total 8.0 6.4 - 8.3 g/dL    Albumin 4.5 3.5 - 5.2 g/dL    Bilirubin Total 0.2 <=1.2 mg/dL    GFR Estimate 86 >60 mL/min/1.73m2      Comment:      Effective December 21, 2021 eGFRcr in adults is calculated using the 2021 CKD-EPI creatinine equation which includes age and gender (Alycia et al., NEJM, DOI: 10.1056/DCTDlx6335522)   Hemoglobin A1c   Result Value Ref Range    Hemoglobin A1C 9.0 (H) 0.0 - 5.6 %      Comment:      Normal <5.7%   Prediabetes 5.7-6.4%    Diabetes 6.5% or higher     Note: Adopted from ADA consensus guidelines.   Lipid Profile   Result Value Ref Range    Cholesterol 241 (H) <200 mg/dL    Triglycerides 466 (H) <150 mg/dL    Direct Measure HDL 54 >=50 mg/dL    LDL Cholesterol Calculated        Comment:      Cannot estimate LDL when triglyceride exceeds 400 mg/dL    Non HDL Cholesterol 187 (H) <130 mg/dL     Narrative    Cholesterol  Desirable:  <200 mg/dL    Triglycerides  Normal:  Less than 150 mg/dL  Borderline High:  150-199 mg/dL  High:  200-499 mg/dL  Very High:  Greater than or equal to 500 mg/dL    Direct Measure HDL  Female:  Greater than or equal to 50 mg/dL   Male:  Greater than or equal to 40 mg/dL    LDL Cholesterol  Desirable:  <100mg/dL  Above Desirable:  100-129 mg/dL   Borderline High:  130-159 mg/dL   High:  160-189 mg/dL   Very High:  >= 190 mg/dL    Non HDL Cholesterol  Desirable:  130 mg/dL  Above Desirable:  130-159 mg/dL  Borderline High:  160-189 mg/dL  High:  190-219 mg/dL  Very High:  Greater than or equal to 220 mg/dL   Protein  random urine   Result Value Ref Range    Total Protein Urine mg/dL 148.0 mg/dL      Comment:      The reference ranges have not been established in urine protein. The results should be integrated into the clinical context for interpretation.    Total Protein UR MG/MG CR 1.14 (H) 0.00 - 0.20 mg/mg Cr    Creatinine Urine mg/dL 130.0 mg/dL      Comment:      The reference ranges have not been established in urine creatinine. The results should be integrated into the clinical context for interpretation.

## 2022-10-19 ENCOUNTER — TELEPHONE (OUTPATIENT)
Dept: ANESTHESIOLOGY | Facility: CLINIC | Age: 57
End: 2022-10-19

## 2022-10-19 DIAGNOSIS — R25.2 BILATERAL LEG CRAMPS: Primary | ICD-10-CM

## 2022-10-19 RX ORDER — PRAMIPEXOLE DIHYDROCHLORIDE 0.25 MG/1
0.5 TABLET ORAL
Qty: 60 TABLET | Refills: 3 | Status: SHIPPED | OUTPATIENT
Start: 2022-10-19

## 2022-10-19 RX ORDER — LIDOCAINE/PRILOCAINE 2.5 %-2.5%
CREAM (GRAM) TOPICAL PRN
Qty: 30 G | Refills: 3 | Status: SHIPPED | OUTPATIENT
Start: 2022-10-19 | End: 2023-03-17

## 2022-10-19 NOTE — TELEPHONE ENCOUNTER
Reviewed previous records from patient's previous PCP. Agreed to refill meds as noted below with increase in pramipexole as requested. I would want to see Terri back in clinic if symptoms continue to be unmanageable.    Terri was seen today for refill request.    Diagnoses and all orders for this visit:    Bilateral leg cramps  -     lidocaine-prilocaine (EMLA) 2.5-2.5 % external cream; Apply topically as needed for moderate pain  -     pramipexole (MIRAPEX) 0.25 MG tablet; Take 2 tablets (0.5 mg) by mouth nightly as needed (restless legs)      Arben Horta MD  11:56 AM, October 19, 2022

## 2022-10-19 NOTE — TELEPHONE ENCOUNTER
I called patient to remind them of there appointment at 1:40 pm to arrive 15-20 minutes prior allow time for parking    Also to complete there questionnaire prior to there appointment

## 2022-10-19 NOTE — TELEPHONE ENCOUNTER
"[facility] a request for a refill of Lidocaine gel (for legs) and an increase/refill of her Pramipexole due to \"restless legs real bad.\"  Will submit to Dr. Horta for approval.  BHAVNA Salter, RN  10/19/22, 11:20 AM    "

## 2022-10-19 NOTE — TELEPHONE ENCOUNTER
Medication requested: lidocaine-prilocaine (EMLA) 2.5-2.5 % external cream  Last office visit: 9/21/2022  [facility] appointments: none  Medication last refilled: Historical  Last qualifying labs: n/a    Medication requested: pramipexole (MIRAPEX) 0.25 MG tablet  Last office visit: 9/21/2022  [facility] appointments: none  Medication last refilled: Historical  Last qualifying labs:   Component      Latest Ref Rng & Units 9/21/2022   Creatinine      0.51 - 0.95 mg/dL 0.80     Component      Latest Ref Rng & Units 9/21/2022   ALT      10 - 35 U/L 21     BP Readings from Last 3 Encounters:   09/21/22 129/89   08/29/22 (!) 145/99   04/10/18 147/89     Contains abnormal data CBC WITH PLTS/AUTO DIFF  Order: [id number]   Ref Range & Units 1 mo ago   WBC 4 - 10 k/cmm 5.25    RBC 3.9 - 5.2 m/cmm 3.49 Low     Hgb 11.5 - 15.7 g/dL 9.1 Low     Hematocrit 34 - 45 % 28.6 Low     MCV 80 - 100 fL 81.9    MCH 25 - 32 pg 26.1    MCHC 31 - 36 g/dL 31.8    RDW 11.5 - 14.5 % 14.6 High     Plt 150 - 400 k/cmm 178    MPV 6.5 - 12.5 fL 11.4    Automated Abs Neutrophil 1.7 - 6.5 k/cmm 3.83    Comment: Preliminary ANC, Final Result to Follow   Abs Immature Granulocyte 0 - 0.09 k/cmm 0.03    Comment: The Immature Granulocyte Absolute count contains metamyelocytes and myelocytes.   Abs Neutrophil 1.7 - 6.5 k/cmm 3.83    Abs Lymphocyte 0.8 - 4 k/cmm 0.94    Abs Monocyte 0.2 - 1 k/cmm 0.34    Abs Eosinophil 0 - 0.6 k/cmm 0.10    Abs Basophil 0 - 0.2 k/cmm 0.01      Routing refill request to provider for review/approval because:  Medication is reported/historical    BHAVNA Salter, RN  10/19/22, 11:38 AM

## 2022-10-21 DIAGNOSIS — R32 URINARY INCONTINENCE, UNSPECIFIED TYPE: Primary | ICD-10-CM

## 2022-10-25 ENCOUNTER — TELEPHONE (OUTPATIENT)
Dept: ANESTHESIOLOGY | Facility: CLINIC | Age: 57
End: 2022-10-25

## 2022-10-25 NOTE — TELEPHONE ENCOUNTER
I called patient to remind them of there appointment at 7:40 am to arrive 15-20 minutes prior allow time for parking    Also to complete there questionnaire prior to there appointment

## 2022-10-26 ENCOUNTER — OFFICE VISIT (OUTPATIENT)
Dept: ANESTHESIOLOGY | Facility: CLINIC | Age: 57
End: 2022-10-26
Payer: COMMERCIAL

## 2022-10-26 VITALS — HEART RATE: 105 BPM | OXYGEN SATURATION: 99 % | DIASTOLIC BLOOD PRESSURE: 70 MMHG | SYSTOLIC BLOOD PRESSURE: 119 MMHG

## 2022-10-26 DIAGNOSIS — N64.4 BREAST PAIN: ICD-10-CM

## 2022-10-26 DIAGNOSIS — M25.512 PAIN IN JOINT OF LEFT SHOULDER: ICD-10-CM

## 2022-10-26 DIAGNOSIS — M89.8X1 PAIN OF LEFT CLAVICLE: ICD-10-CM

## 2022-10-26 DIAGNOSIS — M79.7 FIBROMYALGIA: ICD-10-CM

## 2022-10-26 DIAGNOSIS — F10.20 ALCOHOL USE DISORDER, SEVERE, DEPENDENCE (H): ICD-10-CM

## 2022-10-26 DIAGNOSIS — G44.221 CHRONIC TENSION-TYPE HEADACHE, INTRACTABLE: ICD-10-CM

## 2022-10-26 PROCEDURE — 99204 OFFICE O/P NEW MOD 45 MIN: CPT | Mod: GC | Performed by: ANESTHESIOLOGY

## 2022-10-26 ASSESSMENT — PAIN SCALES - GENERAL: PAINLEVEL: WORST PAIN (10)

## 2022-10-26 NOTE — LETTER
10/26/2022       RE: Terri Mullen  [address]  [facility] [address]     Dear Colleague,    Thank you for referring your patient, Terri Mullen, to the [facility]NAG[facility] [place] at [facility]. Please see a copy of my visit note below.                          [facility] Consultation    Date of visit: 10/26/2022    Reason for consultation:    Terri Mullen is a 57 year old female who is seen in consultation today at the request of her provider, Dr. Horta.    Primary Care Provider is Arben Horta.  Pain medications are not currently being prescribed.    Please see the [facility] health questionnaire which the patient completed and reviewed with me in detail.    Chief Complaint:    Chief Complaint   Patient presents with     Consult     New patient: christineal for generalized pain       Pain history:  Terri Mullen is a 57 year old female who first started having problems with pain diffusely including pain to her bilateral knees, lower legs, thighs, stomach, mid-back, R shoulder, hands, and chronic headaches.     Pain rating: intensity ranges from 4/10 to 9/10, and Averages 8/10 on a 0-10 scale.  Aggravating factors include: movement, walking, car rides, lying in bed  Relieving factors include: Nothing outside of medications   Any bowel or bladder incontinence: No     Current treatments include:  Ibuprofen   Tylenol    Previous medication treatments included:  Oxycodone  Suboxone  Gabapentin  Pregabalin    Other treatments have included:  Terri Mullen has been seen at a pain clinic in the past.  [facility]   PT: Yes, reports experiencing increased edema to legs with PT and no improvements   Acupuncture: Yes, reports no alleviation   Injections: Yes, received steroid injections in the past to knees, back and shoulder. Reports no longer receiving them due to  shortened duration of relief along with concerns for worsening control of blood sugars    Past Medical History:  Past Medical History:   Diagnosis Date     Alcohol use disorder, severe, dependence (H) 12/8/2011    Formatting of this note is different from the original. 02/2016:  11/2015 Tx at [address], left 12/14/15, 2 days left.  Clean and sober since 11-16-15.  Engaged with [facility] [place].  4/28/16 Had planned on intensive OP residential treatment, decided not to d/t takes care of nieces children.       Last Assessment & Plan:  Formatting of this note might be different from the original. Remains sober at      Arthritis      Asthma      Atrophic vaginitis 4/13/2020     Bilateral leg edema 6/3/2019    Formatting of this note might be different from the original. Last Assessment & Plan:  Formatting of this note might be different from the original. Significant edema that is non pitting of both extremities with 2 wounds. Looks like her primary provider has reached out to get Terri in for wounds care. Today her wounds were cleansed, covered with bacitracin and sterile dressing. Encouraged her to e     Breast pain 11/8/2018    Formatting of this note might be different from the original. Last Assessment & Plan:  Formatting of this note might be different from the original. Terri did recently have a mammogram that was ok, but will trust her feeling that something is not right even if she cannot articulate it well and will order a repeat mammogram. Encouraged hypoallergenic lotion for whole body including breasts for itch     Chronic tension-type headache, intractable 11/17/2011    Formatting of this note is different from the original. 5/25/2016 Neurology: no signs of a neurologic process, intracranial pathology not suspected.  HA even when taking Topamax (March-Oct 2015).  HA multifactorial, stress, diet/hydration, HTN.     Last Assessment & Plan:  Formatting of this note might be different from the original. Sees   [facility] next week for neck pain which may be contribut     Chronic venous insufficiency 11/13/2020    Formatting of this note might be different from the original. Last Assessment & Plan:  Formatting of this note might be different from the original. Placing unna boots in clinic today. Encouraged to discuss issues with wound clinic at next visit. Last Assessment & Plan:  Formatting of this note might be different from the original. Placing unna boots in clinic today. Encouraged to discuss issues w     Closed fracture of base of fifth metatarsal bone of left foot at metaphyseal-diaphyseal junction 10/7/2019    Formatting of this note might be different from the original. Last Assessment & Plan:  Formatting of this note might be different from the original. Has changed her mind from ED visit and would now like rehab. Will work with team to try to connect to TCU and reach out to Terri if we have an update. Last Assessment & Plan:  Formatting of this note might be different from the original. Has changed h     Cocaine substance abuse (H) 9/20/2017     Constipation 2/15/2021     Diabetes mellitus (H)      Dyslipidemia 8/20/2015    Formatting of this note might be different from the original. 06/2016             L=76  H=35 10/2012 C=188, L=81, H=58, T=245  ASCVD 10 yr risk is 21.5%.  On Atorvastatin 40 mg Formatting of this note might be different from the original. Formatting of this note might be different from the original. 06/2016             L=76  H=35 10/2012 C=188, L=81, H=58, T=245  ASCVD 10 yr risk is 21.5%.  On Cedrick     Dyspnea on exertion 10/1/2012    Formatting of this note might be different from the original. Largely due to weight and deconditioning. See pulmonology note from 6/7/2019 for full work up description.   Last Assessment & Plan:  Formatting of this note might be different from the original. Based on pulmonology work up, dyspnea on exertion likely related to weight and deconditioning rather than  asthma or COPD, therefore discontinu     Fibromyalgia 9/20/2017    Formatting of this note might be different from the original. Lyrica not covered due to intermittent filling of script (7/12 months), must try Duloxetine first.  11/2013 Opioid oversight, narcotics discontinued d/t EtOH use.   08/2015  Cymbalta 60 mg daily  Last Assessment & Plan:  Formatting of this note might be different from the original. Treri asked to be transferred to the  to try      Gastroesophageal reflux disease 8/29/2022     Greater trochanteric bursitis of both hips 3/22/2013    Formatting of this note might be different from the original. 03/2013 injections in BL hips Formatting of this note might be different from the original. Formatting of this note might be different from the original. 03/2013 injections in BL hips     Hepatic steatosis 5/10/2019    Formatting of this note might be different from the original. Last Assessment & Plan:  Formatting of this note might be different from the original. Foot care provided- nails trimmed, dystrophic nails sanded with Dremel, foot massage and new socks provided.  Formatting of this note might be different from the original. Per abdominal ultrasound September 2018  Last Assessment & Plan:  Formatting of     History of alcohol abuse 12/8/2011    Formatting of this note is different from the original. Overview:  02/2016:  11/2015 Tx at [address], left 12/14/15, 2 days left.  Clean and sober since 11-16-15.  Engaged with [facility][note 3].  4/28/16 Had planned on intensive OP residential treatment, decided not to d/t takes care of nieces children.      Last Assessment & Plan:  A: Pt endorses drinking a 12 pkg of beer on Friday, no plans to quit      Hydradenitis 6/15/2017    Last Assessment & Plan:  Formatting of this note might be different from the original. Terri reports having gotten hydradenitis suppurativa about 7 years ago that opened and left a wound. She has not had any episodes  since then but appears to have 4 new nodules today. Given her uncontrolled DM, after discussing with other providers in the clinic, I am prescribing a 7 day supply of Bactrim. We disc     Hypertension      Lichen simplex chronicus 12/1/2017    Formatting of this note might be different from the original. 5/31/17 obgyn visit: Biopsy shows Lichen Simplex Chronicus 2/2 chronic eczematicus dermatitis.  - f/u with OB/gyn clinic in 2-3 weeks - betamethasone qhs to irritated arean x 2 weeks (clobetasol not covered by insurance, therefore juan luis recommended this plan)  Last Assessment & Plan:  Formatting of this note might be different from t     Major depressive disorder, recurrent severe without psychotic features (H) 11/14/2011    Last Assessment & Plan:  Formatting of this note might be different from the original. Can work with LP in clinic again if interested. Seems stable at this time.     Microcytic anemia 6/22/2016    Formatting of this note is different from the original. 06/2016 Hgb = 10.9.    H/O anemia dating to 2011, baseline Hgb = 11.0. On 8/3/15 Hgb 10.6.   Iron studies c/w mixed, ACD/SUZIE.  Pt sober since 11-16-15.    Last Assessment & Plan:  Formatting of this note might be different from the original. Recent dip in hemoglobin. Colonoscopy is scheduled, will order egd as well.  Formatting of this note m     Migraine 3/10/2021     Morbid obesity (H) 11/9/2010    Last Assessment & Plan:  Formatting of this note might be different from the original. Unfortunately affecting breathing and pain. Recommend working with Dameon Brambila on binge eating. Formatting of this note might be different from the original. Last Assessment & Plan:  Formatting of this note might be different from the original. HgbA1c amazing today! Working on treating apnea to reduce edema as mu     Multiple drug resistant organism (MDRO) culture positive 5/14/2021     JUSTEN (obstructive sleep apnea) 7/7/2016    Formatting of this note might be  "different from the original. Prescribed CPAP 10 cmH20 after 6/27/2019 PSG.  Last Assessment & Plan:  Formatting of this note might be different from the original. Encouraged trying CPAP again, letting us know what issues come up so we can try to trouble shoot. Reviewed all the ways poorly treated apnea can affect her health. Formatting of this note might be differe     Osteoarthrosis 9/20/2017     Pain in joint of left shoulder 3/10/2021     Pain of left clavicle 11/11/2019    Formatting of this note might be different from the original. Last Assessment & Plan:  Formatting of this note might be different from the original. Has complained of pain in left clavicle for 1 year now. Says it is worsening. Imaging a year ago were reassuring. Suspect related to left shoulder osteoarthritis but will reassess with imaging today. Last Assessment & Plan:  Formatting of this note mi     Posttraumatic stress disorder 11/14/2011    Last Assessment & Plan:  Formatting of this note might be different from the original. Mood seems improved today, possibly from hope about treatment but also proud of herself regarding weight loss (see \"obesity\"). Still having some insomnia at night. Discussed as team and will put her back on small dose of Seroquel PRN at bedtime to help her get some sleep, especially as she starts treatment, so t     Recurrent UTI 9/25/2019    Formatting of this note might be different from the original. Last Assessment & Plan:  Formatting of this note might be different from the original. Urine testing today to rule out infectious etiology of symptoms due to report of bad odor and dysuria. Pyridium for 3 days PRN. Last Assessment & Plan:  Formatting of this note might be different from the original. Reviewed preventive measures. Sendin     Restless legs 11/22/2021    Last Assessment & Plan:  Formatting of this note might be different from the original. Sx suggestive of restless legs.  Will check iron (ferritin).  " Would consider trial of pramipexole.     Small bowel obstruction due to adhesions (H) 2/27/2022     Vertigo      Patient Active Problem List    Diagnosis Date Noted     Diabetes mellitus, type 2 (H) 09/20/2022     Priority: Medium     Gastroesophageal reflux disease 08/29/2022     Priority: Medium     Insomnia 08/29/2022     Priority: Medium     Small bowel obstruction due to adhesions (H) 02/27/2022     Priority: Medium     Restless legs 11/22/2021     Priority: Medium     Last Assessment & Plan:   Formatting of this note might be different from the original.  Sx suggestive of restless legs.  Will check iron (ferritin).  Would consider trial of pramipexole.       Multiple drug resistant organism (MDRO) culture positive 05/14/2021     Priority: Medium     Migraine 03/10/2021     Priority: Medium     Pain in joint of left shoulder 03/10/2021     Priority: Medium     Anxiety 02/15/2021     Priority: Medium     Constipation 02/15/2021     Priority: Medium     Bilateral leg cramps 11/13/2020     Priority: Medium     Formatting of this note might be different from the original.  Last Assessment & Plan:   Formatting of this note might be different from the original.  Check BMP and mag. Will send in supplements for slightly low mag. Please f/u with PCP if no improvement of leg pains.  Last Assessment & Plan:   Formatting of this note might be different from the original.  Check BMP and mag. Will send in supplements for slightly low mag. Please f/u with PCP if no improvement of leg pains.       Chronic venous insufficiency 11/13/2020     Priority: Medium     Formatting of this note might be different from the original.  Last Assessment & Plan:   Formatting of this note might be different from the original.  Placing unna boots in clinic today. Encouraged to discuss issues with wound clinic at next visit.  Last Assessment & Plan:   Formatting of this note might be different from the original.  Placing unna boots in clinic  today. Encouraged to discuss issues with wound clinic at next visit.       Hot flashes 09/30/2020     Priority: Medium     Formatting of this note might be different from the original.  Last Assessment & Plan:   Formatting of this note might be different from the original.  Follow up with PCP regarding this concern. Will not add any medication treatment at this time.  Last Assessment & Plan:   Formatting of this note might be different from the original.  Follow up with PCP regarding this concern. Will not add any medication treatment at this time.       Atrophic vaginitis 04/13/2020     Priority: Medium     Pain of left clavicle 11/11/2019     Priority: Medium     Formatting of this note might be different from the original.  Last Assessment & Plan:   Formatting of this note might be different from the original.  Has complained of pain in left clavicle for 1 year now. Says it is worsening. Imaging a year ago were reassuring. Suspect related to left shoulder osteoarthritis but will reassess with imaging today.  Last Assessment & Plan:   Formatting of this note might be different from the original.  Has complained of pain in left clavicle for 1 year now. Says it is worsening. Imaging a year ago were reassuring. Suspect related to left shoulder osteoarthritis but will reassess with imaging today.       Closed fracture of base of fifth metatarsal bone of left foot at metaphyseal-diaphyseal junction 10/07/2019     Priority: Medium     Formatting of this note might be different from the original.  Last Assessment & Plan:   Formatting of this note might be different from the original.  Has changed her mind from ED visit and would now like rehab. Will work with team to try to connect to TCU and reach out to Terri if we have an update.  Last Assessment & Plan:   Formatting of this note might be different from the original.  Has changed her mind from ED visit and would now like rehab. Will work with team to try to connect to  TCU and reach out to Terri if we have an update.       Recurrent UTI 09/25/2019     Priority: Medium     Formatting of this note might be different from the original.  Last Assessment & Plan:   Formatting of this note might be different from the original.  Urine testing today to rule out infectious etiology of symptoms due to report of bad odor and dysuria. Pyridium for 3 days PRN.  Last Assessment & Plan:   Formatting of this note might be different from the original.  Reviewed preventive measures. Sending estrogen cream to current pharmacy.       Cataract, right 09/19/2019     Priority: Medium     Formatting of this note might be different from the original.  Last Assessment & Plan:   Formatting of this note might be different from the original.  Early cataracts of the right eye    - Not visually significant. Non-surgical at this time   - Observe  Last Assessment & Plan:   Formatting of this note might be different from the original.  - Mild cataract in right eye, not visually significant   - Non-surgical at this time, good VA with new glasses prescription  - Patient educated, will observe       Bilateral leg edema 06/03/2019     Priority: Medium     Formatting of this note might be different from the original.  Last Assessment & Plan:   Formatting of this note might be different from the original.  Significant edema that is non pitting of both extremities with 2 wounds. Looks like her primary provider has reached out to get Terri in for wounds care. Today her wounds were cleansed, covered with bacitracin and sterile dressing. Encouraged her to elevate her legs when able.  Last Assessment & Plan:   Formatting of this note might be different from the original.  Significant edema that is non pitting of both extremities with 2 wounds. Looks like her primary provider has reached out to get Terri in for wounds care. Today her wounds were cleansed, covered with bacitracin and sterile dressing. Encouraged her to elevate  her legs when able.       Dental erosion 05/13/2019     Priority: Medium     Hepatic steatosis 05/10/2019     Priority: Medium     Formatting of this note might be different from the original.  Last Assessment & Plan:   Formatting of this note might be different from the original.  Foot care provided- nails trimmed, dystrophic nails sanded with Dremel, foot massage and new socks provided.    Formatting of this note might be different from the original.  Per abdominal ultrasound September 2018    Last Assessment & Plan:   Formatting of this note might be different from the original.  Overdue for follow up with liver clinic (canceled in March due to pandemic). Will try to reschedule.  Formatting of this note might be different from the original.  Per abdominal ultrasound September 2018    Last Assessment & Plan:   Formatting of this note might be different from the original.  Overdue for follow up with liver clinic (canceled in March due to pandemic). Will try to reschedule.       Breast pain 11/08/2018     Priority: Medium     Formatting of this note might be different from the original.  Last Assessment & Plan:   Formatting of this note might be different from the original.  Terri did recently have a mammogram that was ok, but will trust her feeling that something is not right even if she cannot articulate it well and will order a repeat mammogram. Encouraged hypoallergenic lotion for whole body including breasts for itching.  Last Assessment & Plan:   Formatting of this note might be different from the original.  Terri did recently have a mammogram that was ok, but will trust her feeling that something is not right even if she cannot articulate it well and will order a repeat mammogram. Encouraged hypoallergenic lotion for whole body including breasts for itching.       Dry eyes, bilateral 05/24/2018     Priority: Medium     Formatting of this note might be different from the original.  Last Assessment & Plan:    Formatting of this note might be different from the original.  - Dry eyes bilaterally, left > right  - No signs of inflammation or infection  - Start artificial tear drops QID and PRN   - Start AT ointment QHS  - Instructed to start daily warm compresses and lid scrubs  - Monitor; to return if symptoms do not improve as anticipated  Last Assessment & Plan:   Formatting of this note might be different from the original.  - Dry eyes bilaterally causing intermittent pain OD.   - No signs of inflammation or infection  - Start artificial tear drops QID and PRN   - Monitor; to return if symptoms do not improve as anticipated       Lichen simplex chronicus 12/01/2017     Priority: Medium     Formatting of this note might be different from the original.  5/31/17 obgyn visit:  Biopsy shows Lichen Simplex Chronicus 2/2 chronic eczematicus dermatitis.   - f/u with OB/gyn clinic in 2-3 weeks  - betamethasone qhs to irritated arean x 2 weeks (clobetasol not covered by insurance, therefore pharamcy recommended this plan)    Last Assessment & Plan:   Formatting of this note might be different from the original.  Terri will try to reduce cleaning with soap, switch to cleanser if needed. Moisturizer on external aspects if needed.  Formatting of this note might be different from the original.  Formatting of this note might be different from the original.  5/31/17 obgyn visit:  Biopsy shows Lichen Simplex Chronicus 2/2 chronic eczematicus dermatitis.   - f/u with OB/gyn clinic in 2-3 weeks  - betamethasone qhs to irritated arean x 2 weeks (clobetasol not covered by insurance, therefore pharamcy recommended this plan)    Last Assessment & Plan:   Formatting of this note might be different from the original.  Terri will try to reduce cleaning with soap, switch to cleanser if needed. Moisturizer on external aspects if needed.       Fibromyalgia 09/20/2017     Priority: Medium     Formatting of this note might be different from the  original.  Lyrica not covered due to intermittent filling of script (7/12 months), must try Duloxetine first.  11/2013 Opioid oversight, narcotics discontinued d/t EtOH use.   08/2015  Cymbalta 60 mg daily    Last Assessment & Plan:   Formatting of this note might be different from the original.  Terri asked to be transferred to the  to try to schedule with our pain clinic.  Formatting of this note might be different from the original.  Overview:   Lyrica not covered due to intermittent filling of script (7/12 months), must try Duloxetine first.  11/2013 Opioid oversight, narcotics discontinued d/t EtOH use.   08/2015  Cymbalta 60 mg daily    Last Assessment & Plan:   Terri continues to be upset about her pain control. I reminded her of interventions we had discussed at previous visits, including PT and integrative health. She scheduled these and seemed open to their possibility to help. We had discussed at the opioid oversight committee increasing her Topamax to help with her pain, mood, and weight, so we did so at this visit after discussing with Terri. After talking with Dameon Brambila, there is some concern about possible adverse effects. In addition, Terri could use more support regarding her depression and the screaming she reports hearing. So he will be connecting her to  for psychiatric care. We will follow their recommendations regarding Topamax and her other medications.  Formatting of this note might be different from the original.  Formatting of this note might be different from the original.  Lyrica not covered due to intermittent filling of script (7/12 months), must try Duloxetine first.  11/2013 Opioid oversight, narcotics discontinued d/t EtOH use.   08/2015  Cymbalta 60 mg daily    Last Assessment & Plan:   Formatting of this note might be different from the original.  Doing much better back on Suboxone. Will stick with this for the time being. Will discuss Sublocade with Dr. Worley as  another option for Terri.       Cocaine substance abuse (H) 09/20/2017     Priority: Medium     Osteoarthrosis 09/20/2017     Priority: Medium     Hydradenitis 06/15/2017     Priority: Medium     Last Assessment & Plan:   Formatting of this note might be different from the original.  Terri reports having gotten hydradenitis suppurativa about 7 years ago that opened and left a wound. She has not had any episodes since then but appears to have 4 new nodules today. Given her uncontrolled DM, after discussing with other providers in the clinic, I am prescribing a 7 day supply of Bactrim. We discussed using hot packs and returning if her symptoms worsen.  Formatting of this note might be different from the original.  Last Assessment & Plan:   Formatting of this note might be different from the original.  Terri reports having gotten hydradenitis suppurativa about 7 years ago that opened and left a wound. She has not had any episodes since then but appears to have 4 new nodules today. Given her uncontrolled DM, after discussing with other providers in the clinic, I am prescribing a 7 day supply of Bactrim. We discussed using hot packs and returning if her symptoms worsen.       JUSTEN (obstructive sleep apnea) 07/07/2016     Priority: Medium     Formatting of this note might be different from the original.  Prescribed CPAP 10 cmH20 after 6/27/2019 PSG.    Last Assessment & Plan:   Formatting of this note might be different from the original.  Encouraged trying CPAP again, letting us know what issues come up so we can try to trouble shoot. Reviewed all the ways poorly treated apnea can affect her health.  Formatting of this note might be different from the original.  Formatting of this note might be different from the original.  Prescribed CPAP 10 cmH20 after 6/27/2019 PSG.    Last Assessment & Plan:   Formatting of this note might be different from the original.  Encouraged trying CPAP again, letting us know what issues come  up so we can try to trouble shoot. Reviewed all the ways poorly treated apnea can affect her health.       Microcytic anemia 06/22/2016     Priority: Medium     Formatting of this note is different from the original.  06/2016 Hgb = 10.9.    H/O anemia dating to 2011, baseline Hgb = 11.0. On 8/3/15 Hgb 10.6.   Iron studies c/w mixed, ACD/SUZIE.  Pt sober since 11-16-15.      Last Assessment & Plan:   Formatting of this note might be different from the original.  Recent dip in hemoglobin. Colonoscopy is scheduled, will order egd as well.    Formatting of this note might be different from the original.  6/22/2016  Formatting of this note is different from the original.  Formatting of this note might be different from the original.  06/2016 Hgb = 10.9.    H/O anemia dating to 2011, baseline Hgb = 11.0. On 8/3/15 Hgb 10.6.   Iron studies c/w mixed, ACD/SUZEI.  Pt sober since 11-16-15.      Last Assessment & Plan:   Formatting of this note might be different from the original.  Recent dip in hemoglobin. Colonoscopy is scheduled, will order egd as well.    Formatting of this note might be different from the original.  6/22/2016       Dyslipidemia 08/20/2015     Priority: Medium     Formatting of this note might be different from the original.  06/2016             L=76  H=35  10/2012 C=188, L=81, H=58, T=245  ASCVD 10 yr risk is 21.5%.  On Atorvastatin 40 mg  Formatting of this note might be different from the original.  Formatting of this note might be different from the original.  06/2016             L=76  H=35  10/2012 C=188, L=81, H=58, T=245  ASCVD 10 yr risk is 21.5%.  On Atorvastatin 40 mg       Myopia of both eyes with astigmatism and presbyopia 07/28/2015     Priority: Medium     Last Assessment & Plan:   Formatting of this note might be different from the original.  Refractive Error/Presbyopia  - The results of the refraction were discussed with the patient and a new prescription for bifocal/progressive spectacles was  dispensed.  Formatting of this note might be different from the original.  Last Assessment & Plan:   Formatting of this note might be different from the original.  Refractive Error  - The results of the refraction were discussed with the patient and a new prescription for bifocal/progressive spectacles was dispensed       Greater trochanteric bursitis of both hips 03/22/2013     Priority: Medium     Formatting of this note might be different from the original.  03/2013 injections in BL hips  Formatting of this note might be different from the original.  Formatting of this note might be different from the original.  03/2013 injections in BL hips       Dyspnea on exertion 10/01/2012     Priority: Medium     Formatting of this note might be different from the original.  Largely due to weight and deconditioning. See pulmonology note from 6/7/2019 for full work up description.      Last Assessment & Plan:   Formatting of this note might be different from the original.  Based on pulmonology work up, dyspnea on exertion likely related to weight and deconditioning rather than asthma or COPD, therefore discontinuing albuterol and singulair as not effective.  Formatting of this note might be different from the original.  Formatting of this note might be different from the original.  Largely due to weight and deconditioning. See pulmonology note from 6/7/2019 for full work up description.    Last Assessment & Plan:   Formatting of this note might be different from the original.  Based on pulmonology work up, dyspnea on exertion likely related to weight and deconditioning rather than asthma or COPD, therefore discontinuing albuterol and singulair as not effective.       Alcohol use disorder, severe, dependence (H) 12/08/2011     Priority: Medium     Formatting of this note is different from the original.  02/2016:  11/2015 Tx at [address], left 12/14/15, 2 days left.  Clean and sober since 11-16-15.  Engaged with [facility].   4/28/16 Had planned on intensive OP residential treatment, decided not to d/t takes care of nieces children.          Last Assessment & Plan:   Formatting of this note might be different from the original.  Remains sober at this time.  Formatting of this note is different from the original.  Formatting of this note might be different from the original.  02/2016:  11/2015 Tx at [address], left 12/14/15, 2 days left.  Clean and sober since 11-16-15.  Engaged with [facility].  4/28/16 Had planned on intensive OP residential treatment, decided not to d/t takes care of nieces children.        Last Assessment & Plan:   Formatting of this note might be different from the original.  Continues to remain sober.       History of alcohol abuse 12/08/2011     Priority: Medium     Formatting of this note is different from the original.  Overview:   02/2016:  11/2015 Tx at [address], left 12/14/15, 2 days left.  Clean and sober since 11-16-15.  Engaged with [facility].  4/28/16 Had planned on intensive OP residential treatment, decided not to d/t takes care of nieces children.        Last Assessment & Plan:   A: Pt endorses drinking a 12 pkg of beer on Friday, no plans to quit before going to CD treatment (~1 wk) because they will detox her there before the program starts.      P:   - Will check with [facility] to see if 1) pt is actually accepted and 2) if they will detox her  - Encourage pt to stop drinking EtOH now  Formatting of this note is different from the original.  Formatting of this note is different from the original.  Overview:   02/2016:  11/2015 Tx at [address], left 12/14/15, 2 days left.  Clean and sober since 11-16-15.  Engaged with [facility].  4/28/16 Had planned on intensive OP residential treatment, decided not to d/t takes care of nieces children.        Last Assessment & Plan:   A: Pt endorses drinking a 12 pkg of beer on Friday, no plans to quit before going to CD treatment (~1 wk) because they will detox  "her there before the program starts.      P:   - Will check with [facility] to see if 1) pt is actually accepted and 2) if they will detox her  - Encourage pt to stop drinking EtOH now       Chronic tension-type headache, intractable 11/17/2011     Priority: Medium     Formatting of this note is different from the original.  5/25/2016 Neurology: no signs of a neurologic process, intracranial pathology not suspected.  HA even when taking Topamax (March-Oct 2015).  HA multifactorial, stress, diet/hydration, HTN.       Last Assessment & Plan:   Formatting of this note might be different from the original.  Sees Dr. Mtz next week for neck pain which may be contributing.  Formatting of this note is different from the original.  Formatting of this note might be different from the original.  5/25/2016 Neurology: no signs of a neurologic process, intracranial pathology not suspected.  HA even when taking Topamax (March-Oct 2015).  HA multifactorial, stress, diet/hydration, HTN.       Last Assessment & Plan:   Formatting of this note might be different from the original.  Sees Dr. Mtz next week for neck pain which may be contributing.       Major depressive disorder, recurrent severe without psychotic features (H) 11/14/2011     Priority: Medium     Last Assessment & Plan:   Formatting of this note might be different from the original.  Can work with LP in clinic again if interested. Seems stable at this time.       Posttraumatic stress disorder 11/14/2011     Priority: Medium     Last Assessment & Plan:   Formatting of this note might be different from the original.  Mood seems improved today, possibly from hope about treatment but also proud of herself regarding weight loss (see \"obesity\"). Still having some insomnia at night. Discussed as team and will put her back on small dose of Seroquel PRN at bedtime to help her get some sleep, especially as she starts treatment, so that she can focus on her substance use and get " "enough rest to engage in programming. She was very happy with this plan. We discussed partnering this with behavior changes, like not napping during the day.  Formatting of this note might be different from the original.  Last Assessment & Plan:   Formatting of this note might be different from the original.  Mood seems improved today, possibly from hope about treatment but also proud of herself regarding weight loss (see \"obesity\"). Still having some insomnia at night. Discussed as team and will put her back on small dose of Seroquel PRN at bedtime to help her get some sleep, especially as she starts treatment, so that she can focus on her substance use and get enough rest to engage in programming. She was very happy with this plan. We discussed partnering this with behavior changes, like not napping during the day.       Morbid obesity (H) 11/09/2010     Priority: Medium     Last Assessment & Plan:   Formatting of this note might be different from the original.  Unfortunately affecting breathing and pain. Recommend working with Dameon Brambila on binge eating.  Formatting of this note might be different from the original.  Last Assessment & Plan:   Formatting of this note might be different from the original.  HgbA1c amazing today! Working on treating apnea to reduce edema as much as possible.         Past Surgical History:  No past surgical history on file.  Medications:  Current Outpatient Medications   Medication Sig Dispense Refill     acetaminophen (TYLENOL) 500 MG tablet Take 500 mg by mouth 2 times daily       albuterol (PROAIR HFA/PROVENTIL HFA/VENTOLIN HFA) 108 (90 Base) MCG/ACT inhaler Inhale 1-2 puffs into the lungs every 6 hours as needed for shortness of breath / dyspnea or wheezing       ammonium lactate (AMLACTIN) 12 % external cream Apply to skin daily as needed for dryness.       atorvastatin (LIPITOR) 80 MG tablet Take 40 mg by mouth At Bedtime       Blood Glucose Monitoring Suppl MISC Glucose levels " checked BID       calcium carbonate 500 mg, elemental, 1250 (500 Ca) MG tablet chewable Take 500 mg by mouth       carvedilol (COREG) 25 MG tablet Take 25 mg by mouth 2 times daily (with meals)       chlorthalidone (HYGROTON) 50 MG tablet Take 25 mg by mouth daily       diclofenac (VOLTAREN) 1 % topical gel Apply 4 g topically 4 times daily 350 g 3     diphenhydrAMINE (BENADRYL) 25 MG capsule Take 50 mg by mouth every 4 hours as needed for itching       docusate sodium (COLACE) 100 MG capsule Take 100 mg by mouth 2 times daily as needed for constipation       famotidine (PEPCID) 20 MG tablet Take 20 mg by mouth daily       fesoterodine fumarate (TOVIAZ) 4 MG TB24 24 hr tablet Take 4 mg by mouth daily       hydrOXYzine (ATARAX) 25 MG tablet Take 25 mg by mouth 3 times daily       ibuprofen (ADVIL/MOTRIN) 600 MG tablet Take 600 mg by mouth 4 times daily as needed for moderate pain       insulin aspart (NOVOLOG FLEXPEN) 100 UNIT/ML pen Inject 5 Units Subcutaneous 3 times daily (with meals)       insulin glargine (LANTUS PEN) 100 UNIT/ML pen Inject 44 Units Subcutaneous every morning       liraglutide (VICTOZA) 18 MG/3ML solution Inject 1.2 mg Subcutaneous daily       losartan (COZAAR) 100 MG tablet Take 100 mg by mouth daily       melatonin 5 MG tablet Take 5 mg by mouth nightly as needed for sleep       mometasone-formoterol (DULERA) 100-5 MCG/ACT inhaler Inhale 2 puffs into the lungs 2 times daily       naloxone (NARCAN) 4 MG/0.1ML nasal spray Spray 4 mg into one nostril alternating nostrils once as needed for opioid reversal every 2-3 minutes until assistance arrives       OMEPRAZOLE PO Take 40 mg by mouth every morning       polyethylene glycol 3350 POWD 17 g daily as needed (Constipation)       polyvinyl alcohol (LIQUIFILM TEARS) 1.4 % ophthalmic solution Place 1 drop into both eyes 4 times daily       pramipexole (MIRAPEX) 0.25 MG tablet Take 2 tablets (0.5 mg) by mouth nightly as needed (restless legs) 60 tablet  3     prazosin (MINIPRESS) 2 MG capsule Take 4 mg by mouth At Bedtime       senna (SENOKOT) 8.6 MG tablet Take 1 tablet by mouth 2 times daily       varenicline (CHANTIX) 1 MG tablet Take 1 mg by mouth       lidocaine-prilocaine (EMLA) 2.5-2.5 % external cream Apply topically as needed for moderate pain (Patient not taking: Reported on 10/26/2022) 30 g 3     oxyCODONE (ROXICODONE) 5 MG tablet Take 5 mg by mouth every 6 hours as needed for severe pain (1-2 capsules (5-10 mg) by mouth every 6 hours as needed) (Patient not taking: Reported on 10/26/2022)       Skin Protectants, Misc. (EUCERIN) cream Apply 1 g topically 2 times daily (Patient not taking: Reported on 10/26/2022)       Allergies:     Allergies   Allergen Reactions     Lisinopril Swelling     Social History:  Home situation: Assisted Living    Tobacco use: No  Alcohol use: Not currently, hx of use  Drug use: Not currently, hx of cocaine use  History of chemical dependency treatment: Reports staying at half-way house     Family history:  No family history on file.    Review of Systems:    POSTIVE IN BOLD  GENERAL: fever/chills, fatigue, general unwell feeling, weight gain/loss.  HEAD/EYES:  headache, dizziness, or vision changes.    EARS/NOSE/THROAT:  Nosebleeds, hearing loss, sinus infection, earache, tinnitus.  IMMUNE:  Allergies, cancer, immune deficiency, or infections.  SKIN:  Urticaria, rash, hives  HEME/Lymphatic:   anemia, easy bruising, easy bleeding.  RESPIRATORY:  cough, wheezing, or shortness of breath  CARDIOVASCULAR/Circulation:  Extremity edema, syncope, hypertension, tachycardia, or angina.  GASTROINTESTINAL:  abdominal pain, nausea/emesis, diarrhea, constipation,  hematochezia, or melena.  ENDOCRINE:  Diabetes, steroid use,  thyroid disease or osteoporosis.  MUSCULOSKELETAL: neck pain, back pain, arthralgia, arthritis, or gout.  GENITOURINARY:  frequency, urgency, dysuria, difficulty voiding, hematuria or incontinence.  NEUROLOGIC:   weakness, numbness, paresthesias, seizure, tremor, stroke or memory loss.  PSYCHIATRIC:  depression, anxiety, stress, suicidal thoughts or mood swings.     Physical Exam:  Vitals:    10/26/22 0741   BP: 119/70   BP Location: Right arm   Patient Position: Chair   Cuff Size: Adult Large   Pulse: 105   SpO2: 99%     Exam:  Constitutional: alert, no distress, cooperative, in mobile scooter chair, and over weight  Head: normocephalic. Atraumatic.   Eyes: no redness or jaundice noted  ENT: oropharnx normal.  MMM.  Neck supple with tenderness to paraspinal muscles and midline  Cardiovascular: RRR   Respiratory: speaking in full sentences, no increased work of breathing  Gastrointestinal: soft, diffuse tenderness to all quadrants, no rebound or guarding   : deferred  Skin: no suspicious lesions or rashes  Psychiatric: mentation appears normal and affect normal with intermittent tearfulness     Musculoskeletal exam:  Gait/Station/Posture: In scooter chair  Cervical spine: tenderness to paraspinal muscles, normal ROM  Thoracic spine:  Tenderness to midline and paraspinal muscles    Lumbar spine: tenderness to paraspinal muscles    Myofascial tenderness:  Present    Diagnostic tests:    Personally reviewed imaging CT C-spine 8/3/16    Other testing (labs, diagnostics) reviewed:  Labs: A1c 9    [place] Prescription Monitoring Program reviewed    Outside records reviewed      Assessment:  1. Fibromyalgia   2. Arthropathy    Terri Mullen is a 57 year old female who presents with the complaints of diffuse pain in the context of a hx of fibromyalgia and arthropathy (established prior diagnosis). She has been seen at [facility] [place] [facility] in the past and tried multiple modalities including physical therapy, acupuncture, massage therapy, psychologic counseling, steroid injections and multiple medications including pregabalin, gabapentin, oxycodone, suboxone, ibuprofen and tylenol. Previously she had temporary palliation with  steroid injections, but is no longer receiving these due to shortened duration of therapy and difficulty controlling blood sugars with A1c 9. She reports little to no relief with all other therapies except for oxycodone. We discussed that opioid pain medications are not recommended to control her type of chronic pain and I encouraged patient to return to physical therapy as active therapies are more beneficial in this case. She also mentioned desire for medical marijuana, and I explained that I would not recommend it at this time, and only if all other modalities have been used. Also based on past medical history, this in not an ideal choice. Patient was accepting of both of these. We discussed option for diagnostic genicular block with possibility of ablation in the future. Patient said she would think about this and I encouraged her to return if there is anything we can help her with in the future outside of prescribing opioids or medical marijuana.     Plan:  Diagnosis reviewed, treatment option addressed, and risk/benefits discussed.  Self-care instructions given.  I am recommending a multidisciplinary treatment plan to help this patient better manage her pain.      1. Physical Therapy: Recommended patient return to physical therapy as this is the best therapy for her fibromyalgia and arthritic pain  2. Pain Psychologist to address issues of relaxation, behavioral change, coping style, and other factors important to improvement: Recommended patient try this, she declined as this did not work in the past  3. Further procedures recommended: Diagnostic genicular block  4. Follow up: As needed    Total time spent was 50 minutes, and more than 50% of face to face time was spent in counseling and/or coordination of care regarding principles of multidisciplinary care, medication management, and therapeutic options. This time includes time for chart review, preparation, and documentation.     I saw and examined the  patient with the Pain Fellow/Resident. I have reviewed and agree with the resident's note and plan of care and made changes and corrections directly to the body of the note.    TIME SPENT:  BY FELLOW/RESIDENT ALONE 20 MIN  BY MYSELF AND FELLOW/RESIDENT TOGETHER 20 MIN      Denise Mace MD  Pain Medicine, Department of Anesthesiology  , [facility]

## 2022-10-26 NOTE — PROGRESS NOTES
[facility] Consultation    Date of visit: 10/26/2022    Reason for consultation:    Terri Mullen is a 57 year old female who is seen in consultation today at the request of her provider, Dr. Horta.    Primary Care Provider is Arben Horta.  Pain medications are not currently being prescribed.    Please see the [facility] health questionnaire which the patient completed and reviewed with me in detail.    Chief Complaint:    Chief Complaint   Patient presents with     Consult     New patient: eval for generalized pain       Pain history:  Terri Mullen is a 57 year old female who first started having problems with pain diffusely including pain to her bilateral knees, lower legs, thighs, stomach, mid-back, R shoulder, hands, and chronic headaches.     Pain rating: intensity ranges from 4/10 to 9/10, and Averages 8/10 on a 0-10 scale.  Aggravating factors include: movement, walking, car rides, lying in bed  Relieving factors include: Nothing outside of medications   Any bowel or bladder incontinence: No     Current treatments include:  Ibuprofen   Tylenol    Previous medication treatments included:  Oxycodone  Suboxone  Gabapentin  Pregabalin    Other treatments have included:  Terri Mullen has been seen at a pain clinic in the past.  [facility]   PT: Yes, reports experiencing increased edema to legs with PT and no improvements   Acupuncture: Yes, reports no alleviation   Injections: Yes, received steroid injections in the past to knees, back and shoulder. Reports no longer receiving them due to shortened duration of relief along with concerns for worsening control of blood sugars    Past Medical History:  Past Medical History:   Diagnosis Date     Alcohol use disorder, severe, dependence (H) 12/8/2011    Formatting of this note is different from the original. 02/2016:  11/2015 Tx at [address], left 12/14/15, 2 days left.  Clean  and sober since 11-16-15.  Engaged with [facility] [place].  4/28/16 Had planned on intensive OP residential treatment, decided not to d/t takes care of nieces children.       Last Assessment & Plan:  Formatting of this note might be different from the original. Remains sober at      Arthritis      Asthma      Atrophic vaginitis 4/13/2020     Bilateral leg edema 6/3/2019    Formatting of this note might be different from the original. Last Assessment & Plan:  Formatting of this note might be different from the original. Significant edema that is non pitting of both extremities with 2 wounds. Looks like her primary provider has reached out to get Terri in for wounds care. Today her wounds were cleansed, covered with bacitracin and sterile dressing. Encouraged her to e     Breast pain 11/8/2018    Formatting of this note might be different from the original. Last Assessment & Plan:  Formatting of this note might be different from the original. Terri did recently have a mammogram that was ok, but will trust her feeling that something is not right even if she cannot articulate it well and will order a repeat mammogram. Encouraged hypoallergenic lotion for whole body including breasts for itch     Chronic tension-type headache, intractable 11/17/2011    Formatting of this note is different from the original. 5/25/2016 Neurology: no signs of a neurologic process, intracranial pathology not suspected.  HA even when taking Topamax (March-Oct 2015).  HA multifactorial, stress, diet/hydration, HTN.     Last Assessment & Plan:  Formatting of this note might be different from the original. Sees Dr. Mtz next week for neck pain which may be contribut     Chronic venous insufficiency 11/13/2020    Formatting of this note might be different from the original. Last Assessment & Plan:  Formatting of this note might be different from the original. Placing unna boots in clinic today. Encouraged to discuss issues with wound clinic at next  visit. Last Assessment & Plan:  Formatting of this note might be different from the original. Placing unna boots in clinic today. Encouraged to discuss issues w     Closed fracture of base of fifth metatarsal bone of left foot at metaphyseal-diaphyseal junction 10/7/2019    Formatting of this note might be different from the original. Last Assessment & Plan:  Formatting of this note might be different from the original. Has changed her mind from ED visit and would now like rehab. Will work with team to try to connect to TCU and reach out to Terri if we have an update. Last Assessment & Plan:  Formatting of this note might be different from the original. Has changed h     Cocaine substance abuse (H) 9/20/2017     Constipation 2/15/2021     Diabetes mellitus (H)      Dyslipidemia 8/20/2015    Formatting of this note might be different from the original. 06/2016             L=76  H=35 10/2012 C=188, L=81, H=58, T=245  ASCVD 10 yr risk is 21.5%.  On Atorvastatin 40 mg Formatting of this note might be different from the original. Formatting of this note might be different from the original. 06/2016             L=76  H=35 10/2012 C=188, L=81, H=58, T=245  ASCVD 10 yr risk is 21.5%.  On Cedrick     Dyspnea on exertion 10/1/2012    Formatting of this note might be different from the original. Largely due to weight and deconditioning. See pulmonology note from 6/7/2019 for full work up description.   Last Assessment & Plan:  Formatting of this note might be different from the original. Based on pulmonology work up, dyspnea on exertion likely related to weight and deconditioning rather than asthma or COPD, therefore discontinu     Fibromyalgia 9/20/2017    Formatting of this note might be different from the original. Lyrica not covered due to intermittent filling of script (7/12 months), must try Duloxetine first.  11/2013 Opioid oversight, narcotics discontinued d/t EtOH use.   08/2015  Cymbalta 60 mg daily  Last Assessment &  Plan:  Formatting of this note might be different from the original. Terri asked to be transferred to the  to try      Gastroesophageal reflux disease 8/29/2022     Greater trochanteric bursitis of both hips 3/22/2013    Formatting of this note might be different from the original. 03/2013 injections in BL hips Formatting of this note might be different from the original. Formatting of this note might be different from the original. 03/2013 injections in BL hips     Hepatic steatosis 5/10/2019    Formatting of this note might be different from the original. Last Assessment & Plan:  Formatting of this note might be different from the original. Foot care provided- nails trimmed, dystrophic nails sanded with Dremel, foot massage and new socks provided.  Formatting of this note might be different from the original. Per abdominal ultrasound September 2018  Last Assessment & Plan:  Formatting of     History of alcohol abuse 12/8/2011    Formatting of this note is different from the original. Overview:  02/2016:  11/2015 Tx at [address], left 12/14/15, 2 days left.  Clean and sober since 11-16-15.  Engaged with [facility].  4/28/16 Had planned on intensive OP residential treatment, decided not to d/t takes care of nieces children.      Last Assessment & Plan:  A: Pt endorses drinking a 12 pkg of beer on Friday, no plans to quit      Hydradenitis 6/15/2017    Last Assessment & Plan:  Formatting of this note might be different from the original. Terri reports having gotten hydradenitis suppurativa about 7 years ago that opened and left a wound. She has not had any episodes since then but appears to have 4 new nodules today. Given her uncontrolled DM, after discussing with other providers in the clinic, I am prescribing a 7 day supply of Bactrim. We disc     Hypertension      Lichen simplex chronicus 12/1/2017    Formatting of this note might be different from the original. 5/31/17 obgyn visit: Biopsy shows Lichen  Simplex Chronicus 2/2 chronic eczematicus dermatitis.  - f/u with OB/gyn clinic in 2-3 weeks - betamethasone qhs to irritated arean x 2 weeks (clobetasol not covered by insurance, therefore juan luis recommended this plan)  Last Assessment & Plan:  Formatting of this note might be different from t     Major depressive disorder, recurrent severe without psychotic features (H) 11/14/2011    Last Assessment & Plan:  Formatting of this note might be different from the original. Can work with LP in clinic again if interested. Seems stable at this time.     Microcytic anemia 6/22/2016    Formatting of this note is different from the original. 06/2016 Hgb = 10.9.    H/O anemia dating to 2011, baseline Hgb = 11.0. On 8/3/15 Hgb 10.6.   Iron studies c/w mixed, ACD/SUZIE.  Pt sober since 11-16-15.    Last Assessment & Plan:  Formatting of this note might be different from the original. Recent dip in hemoglobin. Colonoscopy is scheduled, will order egd as well.  Formatting of this note m     Migraine 3/10/2021     Morbid obesity (H) 11/9/2010    Last Assessment & Plan:  Formatting of this note might be different from the original. Unfortunately affecting breathing and pain. Recommend working with Cam Brambila on binge eating. Formatting of this note might be different from the original. Last Assessment & Plan:  Formatting of this note might be different from the original. HgbA1c amazing today! Working on treating apnea to reduce edema as mu     Multiple drug resistant organism (MDRO) culture positive 5/14/2021     JUSTEN (obstructive sleep apnea) 7/7/2016    Formatting of this note might be different from the original. Prescribed CPAP 10 cmH20 after 6/27/2019 PSG.  Last Assessment & Plan:  Formatting of this note might be different from the original. Encouraged trying CPAP again, letting us know what issues come up so we can try to trouble shoot. Reviewed all the ways poorly treated apnea can affect her health. Formatting of this note  "might be differe     Osteoarthrosis 9/20/2017     Pain in joint of left shoulder 3/10/2021     Pain of left clavicle 11/11/2019    Formatting of this note might be different from the original. Last Assessment & Plan:  Formatting of this note might be different from the original. Has complained of pain in left clavicle for 1 year now. Says it is worsening. Imaging a year ago were reassuring. Suspect related to left shoulder osteoarthritis but will reassess with imaging today. Last Assessment & Plan:  Formatting of this note mi     Posttraumatic stress disorder 11/14/2011    Last Assessment & Plan:  Formatting of this note might be different from the original. Mood seems improved today, possibly from hope about treatment but also proud of herself regarding weight loss (see \"obesity\"). Still having some insomnia at night. Discussed as team and will put her back on small dose of Seroquel PRN at bedtime to help her get some sleep, especially as she starts treatment, so t     Recurrent UTI 9/25/2019    Formatting of this note might be different from the original. Last Assessment & Plan:  Formatting of this note might be different from the original. Urine testing today to rule out infectious etiology of symptoms due to report of bad odor and dysuria. Pyridium for 3 days PRN. Last Assessment & Plan:  Formatting of this note might be different from the original. Reviewed preventive measures. Sendin     Restless legs 11/22/2021    Last Assessment & Plan:  Formatting of this note might be different from the original. Sx suggestive of restless legs.  Will check iron (ferritin).  Would consider trial of pramipexole.     Small bowel obstruction due to adhesions (H) 2/27/2022     Vertigo      Patient Active Problem List    Diagnosis Date Noted     Diabetes mellitus, type 2 (H) 09/20/2022     Priority: Medium     Gastroesophageal reflux disease 08/29/2022     Priority: Medium     Insomnia 08/29/2022     Priority: Medium     Small " bowel obstruction due to adhesions (H) 02/27/2022     Priority: Medium     Restless legs 11/22/2021     Priority: Medium     Last Assessment & Plan:   Formatting of this note might be different from the original.  Sx suggestive of restless legs.  Will check iron (ferritin).  Would consider trial of pramipexole.       Multiple drug resistant organism (MDRO) culture positive 05/14/2021     Priority: Medium     Migraine 03/10/2021     Priority: Medium     Pain in joint of left shoulder 03/10/2021     Priority: Medium     Anxiety 02/15/2021     Priority: Medium     Constipation 02/15/2021     Priority: Medium     Bilateral leg cramps 11/13/2020     Priority: Medium     Formatting of this note might be different from the original.  Last Assessment & Plan:   Formatting of this note might be different from the original.  Check BMP and mag. Will send in supplements for slightly low mag. Please f/u with PCP if no improvement of leg pains.  Last Assessment & Plan:   Formatting of this note might be different from the original.  Check BMP and mag. Will send in supplements for slightly low mag. Please f/u with PCP if no improvement of leg pains.       Chronic venous insufficiency 11/13/2020     Priority: Medium     Formatting of this note might be different from the original.  Last Assessment & Plan:   Formatting of this note might be different from the original.  Placing unna boots in clinic today. Encouraged to discuss issues with wound clinic at next visit.  Last Assessment & Plan:   Formatting of this note might be different from the original.  Placing unna boots in clinic today. Encouraged to discuss issues with wound clinic at next visit.       Hot flashes 09/30/2020     Priority: Medium     Formatting of this note might be different from the original.  Last Assessment & Plan:   Formatting of this note might be different from the original.  Follow up with PCP regarding this concern. Will not add any medication treatment  at this time.  Last Assessment & Plan:   Formatting of this note might be different from the original.  Follow up with PCP regarding this concern. Will not add any medication treatment at this time.       Atrophic vaginitis 04/13/2020     Priority: Medium     Pain of left clavicle 11/11/2019     Priority: Medium     Formatting of this note might be different from the original.  Last Assessment & Plan:   Formatting of this note might be different from the original.  Has complained of pain in left clavicle for 1 year now. Says it is worsening. Imaging a year ago were reassuring. Suspect related to left shoulder osteoarthritis but will reassess with imaging today.  Last Assessment & Plan:   Formatting of this note might be different from the original.  Has complained of pain in left clavicle for 1 year now. Says it is worsening. Imaging a year ago were reassuring. Suspect related to left shoulder osteoarthritis but will reassess with imaging today.       Closed fracture of base of fifth metatarsal bone of left foot at metaphyseal-diaphyseal junction 10/07/2019     Priority: Medium     Formatting of this note might be different from the original.  Last Assessment & Plan:   Formatting of this note might be different from the original.  Has changed her mind from ED visit and would now like rehab. Will work with team to try to connect to TCU and reach out to [facility] if we have an update.  Last Assessment & Plan:   Formatting of this note might be different from the original.  Has changed her mind from ED visit and would now like rehab. Will work with team to try to connect to TCU and reach out to [facility] if we have an update.       Recurrent UTI 09/25/2019     Priority: Medium     Formatting of this note might be different from the original.  Last Assessment & Plan:   Formatting of this note might be different from the original.  Urine testing today to rule out infectious etiology of symptoms due to report of bad odor and  dysuria. Pyridium for 3 days PRN.  Last Assessment & Plan:   Formatting of this note might be different from the original.  Reviewed preventive measures. Sending estrogen cream to current pharmacy.       Cataract, right 09/19/2019     Priority: Medium     Formatting of this note might be different from the original.  Last Assessment & Plan:   Formatting of this note might be different from the original.  Early cataracts of the right eye    - Not visually significant. Non-surgical at this time   - Observe  Last Assessment & Plan:   Formatting of this note might be different from the original.  - Mild cataract in right eye, not visually significant   - Non-surgical at this time, good VA with new glasses prescription  - Patient educated, will observe       Bilateral leg edema 06/03/2019     Priority: Medium     Formatting of this note might be different from the original.  Last Assessment & Plan:   Formatting of this note might be different from the original.  Significant edema that is non pitting of both extremities with 2 wounds. Looks like her primary provider has reached out to get Terri in for wounds care. Today her wounds were cleansed, covered with bacitracin and sterile dressing. Encouraged her to elevate her legs when able.  Last Assessment & Plan:   Formatting of this note might be different from the original.  Significant edema that is non pitting of both extremities with 2 wounds. Looks like her primary provider has reached out to get Terri in for wounds care. Today her wounds were cleansed, covered with bacitracin and sterile dressing. Encouraged her to elevate her legs when able.       Dental erosion 05/13/2019     Priority: Medium     Hepatic steatosis 05/10/2019     Priority: Medium     Formatting of this note might be different from the original.  Last Assessment & Plan:   Formatting of this note might be different from the original.  Foot care provided- nails trimmed, dystrophic nails sanded with  Dremel, foot massage and new socks provided.    Formatting of this note might be different from the original.  Per abdominal ultrasound September 2018    Last Assessment & Plan:   Formatting of this note might be different from the original.  Overdue for follow up with liver clinic (canceled in March due to pandemic). Will try to reschedule.  Formatting of this note might be different from the original.  Per abdominal ultrasound September 2018    Last Assessment & Plan:   Formatting of this note might be different from the original.  Overdue for follow up with liver clinic (canceled in March due to pandemic). Will try to reschedule.       Breast pain 11/08/2018     Priority: Medium     Formatting of this note might be different from the original.  Last Assessment & Plan:   Formatting of this note might be different from the original.  Terri did recently have a mammogram that was ok, but will trust her feeling that something is not right even if she cannot articulate it well and will order a repeat mammogram. Encouraged hypoallergenic lotion for whole body including breasts for itching.  Last Assessment & Plan:   Formatting of this note might be different from the original.  Terri did recently have a mammogram that was ok, but will trust her feeling that something is not right even if she cannot articulate it well and will order a repeat mammogram. Encouraged hypoallergenic lotion for whole body including breasts for itching.       Dry eyes, bilateral 05/24/2018     Priority: Medium     Formatting of this note might be different from the original.  Last Assessment & Plan:   Formatting of this note might be different from the original.  - Dry eyes bilaterally, left > right  - No signs of inflammation or infection  - Start artificial tear drops QID and PRN   - Start AT ointment QHS  - Instructed to start daily warm compresses and lid scrubs  - Monitor; to return if symptoms do not improve as anticipated  Last  Assessment & Plan:   Formatting of this note might be different from the original.  - Dry eyes bilaterally causing intermittent pain OD.   - No signs of inflammation or infection  - Start artificial tear drops QID and PRN   - Monitor; to return if symptoms do not improve as anticipated       Lichen simplex chronicus 12/01/2017     Priority: Medium     Formatting of this note might be different from the original.  5/31/17 obgyn visit:  Biopsy shows Lichen Simplex Chronicus 2/2 chronic eczematicus dermatitis.   - f/u with OB/gyn clinic in 2-3 weeks  - betamethasone qhs to irritated arean x 2 weeks (clobetasol not covered by insurance, therefore pharamcy recommended this plan)    Last Assessment & Plan:   Formatting of this note might be different from the original.  Terri will try to reduce cleaning with soap, switch to cleanser if needed. Moisturizer on external aspects if needed.  Formatting of this note might be different from the original.  Formatting of this note might be different from the original.  5/31/17 obgyn visit:  Biopsy shows Lichen Simplex Chronicus 2/2 chronic eczematicus dermatitis.   - f/u with OB/gyn clinic in 2-3 weeks  - betamethasone qhs to irritated arean x 2 weeks (clobetasol not covered by insurance, therefore pharamcy recommended this plan)    Last Assessment & Plan:   Formatting of this note might be different from the original.  Terri will try to reduce cleaning with soap, switch to cleanser if needed. Moisturizer on external aspects if needed.       Fibromyalgia 09/20/2017     Priority: Medium     Formatting of this note might be different from the original.  Lyrica not covered due to intermittent filling of script (7/12 months), must try Duloxetine first.  11/2013 Opioid oversight, narcotics discontinued d/t EtOH use.   08/2015  Cymbalta 60 mg daily    Last Assessment & Plan:   Formatting of this note might be different from the original.  Terri asked to be transferred to the   to try to schedule with our pain clinic.  Formatting of this note might be different from the original.  Overview:   Lyrica not covered due to intermittent filling of script (7/12 months), must try Duloxetine first.  11/2013 Opioid oversight, narcotics discontinued d/t EtOH use.   08/2015  Cymbalta 60 mg daily    Last Assessment & Plan:   Terri continues to be upset about her pain control. I reminded her of interventions we had discussed at previous visits, including PT and integrative health. She scheduled these and seemed open to their possibility to help. We had discussed at the opioid oversight committee increasing her Topamax to help with her pain, mood, and weight, so we did so at this visit after discussing with Terri. After talking with Dameon Brambila, there is some concern about possible adverse effects. In addition, Terri could use more support regarding her depression and the screaming she reports hearing. So he will be connecting her to  for psychiatric care. We will follow their recommendations regarding Topamax and her other medications.  Formatting of this note might be different from the original.  Formatting of this note might be different from the original.  Lyrica not covered due to intermittent filling of script (7/12 months), must try Duloxetine first.  11/2013 Opioid oversight, narcotics discontinued d/t EtOH use.   08/2015  Cymbalta 60 mg daily    Last Assessment & Plan:   Formatting of this note might be different from the original.  Doing much better back on Suboxone. Will stick with this for the time being. Will discuss Sublocade with Dr. Worley as another option for Terri.       Cocaine substance abuse (H) 09/20/2017     Priority: Medium     Osteoarthrosis 09/20/2017     Priority: Medium     Hydradenitis 06/15/2017     Priority: Medium     Last Assessment & Plan:   Formatting of this note might be different from the original.  Terri reports having gotten hydradenitis suppurativa about 7  years ago that opened and left a wound. She has not had any episodes since then but appears to have 4 new nodules today. Given her uncontrolled DM, after discussing with other providers in the clinic, I am prescribing a 7 day supply of Bactrim. We discussed using hot packs and returning if her symptoms worsen.  Formatting of this note might be different from the original.  Last Assessment & Plan:   Formatting of this note might be different from the original.  Terri reports having gotten hydradenitis suppurativa about 7 years ago that opened and left a wound. She has not had any episodes since then but appears to have 4 new nodules today. Given her uncontrolled DM, after discussing with other providers in the clinic, I am prescribing a 7 day supply of Bactrim. We discussed using hot packs and returning if her symptoms worsen.       JUSTEN (obstructive sleep apnea) 07/07/2016     Priority: Medium     Formatting of this note might be different from the original.  Prescribed CPAP 10 cmH20 after 6/27/2019 PSG.    Last Assessment & Plan:   Formatting of this note might be different from the original.  Encouraged trying CPAP again, letting us know what issues come up so we can try to trouble shoot. Reviewed all the ways poorly treated apnea can affect her health.  Formatting of this note might be different from the original.  Formatting of this note might be different from the original.  Prescribed CPAP 10 cmH20 after 6/27/2019 PSG.    Last Assessment & Plan:   Formatting of this note might be different from the original.  Encouraged trying CPAP again, letting us know what issues come up so we can try to trouble shoot. Reviewed all the ways poorly treated apnea can affect her health.       Microcytic anemia 06/22/2016     Priority: Medium     Formatting of this note is different from the original.  06/2016 Hgb = 10.9.    H/O anemia dating to 2011, baseline Hgb = 11.0. On 8/3/15 Hgb 10.6.   Iron studies c/w mixed, ACD/SUZIE.   Pt sober since 11-16-15.      Last Assessment & Plan:   Formatting of this note might be different from the original.  Recent dip in hemoglobin. Colonoscopy is scheduled, will order egd as well.    Formatting of this note might be different from the original.  6/22/2016  Formatting of this note is different from the original.  Formatting of this note might be different from the original.  06/2016 Hgb = 10.9.    H/O anemia dating to 2011, baseline Hgb = 11.0. On 8/3/15 Hgb 10.6.   Iron studies c/w mixed, ACD/SUZIE.  Pt sober since 11-16-15.      Last Assessment & Plan:   Formatting of this note might be different from the original.  Recent dip in hemoglobin. Colonoscopy is scheduled, will order egd as well.    Formatting of this note might be different from the original.  6/22/2016       Dyslipidemia 08/20/2015     Priority: Medium     Formatting of this note might be different from the original.  06/2016             L=76  H=35  10/2012 C=188, L=81, H=58, T=245  ASCVD 10 yr risk is 21.5%.  On Atorvastatin 40 mg  Formatting of this note might be different from the original.  Formatting of this note might be different from the original.  06/2016             L=76  H=35  10/2012 C=188, L=81, H=58, T=245  ASCVD 10 yr risk is 21.5%.  On Atorvastatin 40 mg       Myopia of both eyes with astigmatism and presbyopia 07/28/2015     Priority: Medium     Last Assessment & Plan:   Formatting of this note might be different from the original.  Refractive Error/Presbyopia  - The results of the refraction were discussed with the patient and a new prescription for bifocal/progressive spectacles was dispensed.  Formatting of this note might be different from the original.  Last Assessment & Plan:   Formatting of this note might be different from the original.  Refractive Error  - The results of the refraction were discussed with the patient and a new prescription for bifocal/progressive spectacles was dispensed       Greater trochanteric  bursitis of both hips 03/22/2013     Priority: Medium     Formatting of this note might be different from the original.  03/2013 injections in BL hips  Formatting of this note might be different from the original.  Formatting of this note might be different from the original.  03/2013 injections in BL hips       Dyspnea on exertion 10/01/2012     Priority: Medium     Formatting of this note might be different from the original.  Largely due to weight and deconditioning. See pulmonology note from 6/7/2019 for full work up description.      Last Assessment & Plan:   Formatting of this note might be different from the original.  Based on pulmonology work up, dyspnea on exertion likely related to weight and deconditioning rather than asthma or COPD, therefore discontinuing albuterol and singulair as not effective.  Formatting of this note might be different from the original.  Formatting of this note might be different from the original.  Largely due to weight and deconditioning. See pulmonology note from 6/7/2019 for full work up description.    Last Assessment & Plan:   Formatting of this note might be different from the original.  Based on pulmonology work up, dyspnea on exertion likely related to weight and deconditioning rather than asthma or COPD, therefore discontinuing albuterol and singulair as not effective.       Alcohol use disorder, severe, dependence (H) 12/08/2011     Priority: Medium     Formatting of this note is different from the original.  02/2016:  11/2015 Tx at [address], left 12/14/15, 2 days left.  Clean and sober since 11-16-15.  Engaged with [facility].  4/28/16 Had planned on intensive OP residential treatment, decided not to d/t takes care of nieces children.          Last Assessment & Plan:   Formatting of this note might be different from the original.  Remains sober at this time.  Formatting of this note is different from the original.  Formatting of this note might be different from the  original.  02/2016:  11/2015 Tx at [address], left 12/14/15, 2 days left.  Clean and sober since 11-16-15.  Engaged with [facility].  4/28/16 Had planned on intensive OP residential treatment, decided not to d/t takes care of nieces children.        Last Assessment & Plan:   Formatting of this note might be different from the original.  Continues to remain sober.       History of alcohol abuse 12/08/2011     Priority: Medium     Formatting of this note is different from the original.  Overview:   02/2016:  11/2015 Tx at [address], left 12/14/15, 2 days left.  Clean and sober since 11-16-15.  Engaged with [facility].  4/28/16 Had planned on intensive OP residential treatment, decided not to d/t takes care of nieces children.        Last Assessment & Plan:   A: Pt endorses drinking a 12 pkg of beer on Friday, no plans to quit before going to CD treatment (~1 wk) because they will detox her there before the program starts.      P:   - Will check with [facility] to see if 1) pt is actually accepted and 2) if they will detox her  - Encourage pt to stop drinking EtOH now  Formatting of this note is different from the original.  Formatting of this note is different from the original.  Overview:   02/2016:  11/2015 Tx at [address], left 12/14/15, 2 days left.  Clean and sober since 11-16-15.  Engaged with [facility].  4/28/16 Had planned on intensive OP residential treatment, decided not to d/t takes care of nieces children.        Last Assessment & Plan:   A: Pt endorses drinking a 12 pkg of beer on Friday, no plans to quit before going to CD treatment (~1 wk) because they will detox her there before the program starts.      P:   - Will check with [facility] to see if 1) pt is actually accepted and 2) if they will detox her  - Encourage pt to stop drinking EtOH now       Chronic tension-type headache, intractable 11/17/2011     Priority: Medium     Formatting of this note is different from the original.  5/25/2016 Neurology: no  "signs of a neurologic process, intracranial pathology not suspected.  HA even when taking Topamax (March-Oct 2015).  HA multifactorial, stress, diet/hydration, HTN.       Last Assessment & Plan:   Formatting of this note might be different from the original.  Sees Dr. Mtz next week for neck pain which may be contributing.  Formatting of this note is different from the original.  Formatting of this note might be different from the original.  5/25/2016 Neurology: no signs of a neurologic process, intracranial pathology not suspected.  HA even when taking Topamax (March-Oct 2015).  HA multifactorial, stress, diet/hydration, HTN.       Last Assessment & Plan:   Formatting of this note might be different from the original.  Sees Dr. Mtz next week for neck pain which may be contributing.       Major depressive disorder, recurrent severe without psychotic features (H) 11/14/2011     Priority: Medium     Last Assessment & Plan:   Formatting of this note might be different from the original.  Can work with LP in clinic again if interested. Seems stable at this time.       Posttraumatic stress disorder 11/14/2011     Priority: Medium     Last Assessment & Plan:   Formatting of this note might be different from the original.  Mood seems improved today, possibly from hope about treatment but also proud of herself regarding weight loss (see \"obesity\"). Still having some insomnia at night. Discussed as team and will put her back on small dose of Seroquel PRN at bedtime to help her get some sleep, especially as she starts treatment, so that she can focus on her substance use and get enough rest to engage in programming. She was very happy with this plan. We discussed partnering this with behavior changes, like not napping during the day.  Formatting of this note might be different from the original.  Last Assessment & Plan:   Formatting of this note might be different from the original.  Mood seems improved today, " "possibly from hope about treatment but also proud of herself regarding weight loss (see \"obesity\"). Still having some insomnia at night. Discussed as team and will put her back on small dose of Seroquel PRN at bedtime to help her get some sleep, especially as she starts treatment, so that she can focus on her substance use and get enough rest to engage in programming. She was very happy with this plan. We discussed partnering this with behavior changes, like not napping during the day.       Morbid obesity (H) 11/09/2010     Priority: Medium     Last Assessment & Plan:   Formatting of this note might be different from the original.  Unfortunately affecting breathing and pain. Recommend working with Dameon Brambila on binge eating.  Formatting of this note might be different from the original.  Last Assessment & Plan:   Formatting of this note might be different from the original.  HgbA1c amazing today! Working on treating apnea to reduce edema as much as possible.         Past Surgical History:  No past surgical history on file.  Medications:  Current Outpatient Medications   Medication Sig Dispense Refill     acetaminophen (TYLENOL) 500 MG tablet Take 500 mg by mouth 2 times daily       albuterol (PROAIR HFA/PROVENTIL HFA/VENTOLIN HFA) 108 (90 Base) MCG/ACT inhaler Inhale 1-2 puffs into the lungs every 6 hours as needed for shortness of breath / dyspnea or wheezing       ammonium lactate (AMLACTIN) 12 % external cream Apply to skin daily as needed for dryness.       atorvastatin (LIPITOR) 80 MG tablet Take 40 mg by mouth At Bedtime       Blood Glucose Monitoring Suppl MISC Glucose levels checked BID       calcium carbonate 500 mg, elemental, 1250 (500 Ca) MG tablet chewable Take 500 mg by mouth       carvedilol (COREG) 25 MG tablet Take 25 mg by mouth 2 times daily (with meals)       chlorthalidone (HYGROTON) 50 MG tablet Take 25 mg by mouth daily       diclofenac (VOLTAREN) 1 % topical gel Apply 4 g topically 4 times " daily 350 g 3     diphenhydrAMINE (BENADRYL) 25 MG capsule Take 50 mg by mouth every 4 hours as needed for itching       docusate sodium (COLACE) 100 MG capsule Take 100 mg by mouth 2 times daily as needed for constipation       famotidine (PEPCID) 20 MG tablet Take 20 mg by mouth daily       fesoterodine fumarate (TOVIAZ) 4 MG TB24 24 hr tablet Take 4 mg by mouth daily       hydrOXYzine (ATARAX) 25 MG tablet Take 25 mg by mouth 3 times daily       ibuprofen (ADVIL/MOTRIN) 600 MG tablet Take 600 mg by mouth 4 times daily as needed for moderate pain       insulin aspart (NOVOLOG FLEXPEN) 100 UNIT/ML pen Inject 5 Units Subcutaneous 3 times daily (with meals)       insulin glargine (LANTUS PEN) 100 UNIT/ML pen Inject 44 Units Subcutaneous every morning       liraglutide (VICTOZA) 18 MG/3ML solution Inject 1.2 mg Subcutaneous daily       losartan (COZAAR) 100 MG tablet Take 100 mg by mouth daily       melatonin 5 MG tablet Take 5 mg by mouth nightly as needed for sleep       mometasone-formoterol (DULERA) 100-5 MCG/ACT inhaler Inhale 2 puffs into the lungs 2 times daily       naloxone (NARCAN) 4 MG/0.1ML nasal spray Spray 4 mg into one nostril alternating nostrils once as needed for opioid reversal every 2-3 minutes until assistance arrives       OMEPRAZOLE PO Take 40 mg by mouth every morning       polyethylene glycol 3350 POWD 17 g daily as needed (Constipation)       polyvinyl alcohol (LIQUIFILM TEARS) 1.4 % ophthalmic solution Place 1 drop into both eyes 4 times daily       pramipexole (MIRAPEX) 0.25 MG tablet Take 2 tablets (0.5 mg) by mouth nightly as needed (restless legs) 60 tablet 3     prazosin (MINIPRESS) 2 MG capsule Take 4 mg by mouth At Bedtime       senna (SENOKOT) 8.6 MG tablet Take 1 tablet by mouth 2 times daily       varenicline (CHANTIX) 1 MG tablet Take 1 mg by mouth       lidocaine-prilocaine (EMLA) 2.5-2.5 % external cream Apply topically as needed for moderate pain (Patient not taking: Reported  on 10/26/2022) 30 g 3     oxyCODONE (ROXICODONE) 5 MG tablet Take 5 mg by mouth every 6 hours as needed for severe pain (1-2 capsules (5-10 mg) by mouth every 6 hours as needed) (Patient not taking: Reported on 10/26/2022)       Skin Protectants, Misc. (EUCERIN) cream Apply 1 g topically 2 times daily (Patient not taking: Reported on 10/26/2022)       Allergies:     Allergies   Allergen Reactions     Lisinopril Swelling     Social History:  Home situation: Assisted Living    Tobacco use: No  Alcohol use: Not currently, hx of use  Drug use: Not currently, hx of cocaine use  History of chemical dependency treatment: Reports staying at half-way house     Family history:  No family history on file.    Review of Systems:    POSTIVE IN BOLD  GENERAL: fever/chills, fatigue, general unwell feeling, weight gain/loss.  HEAD/EYES:  headache, dizziness, or vision changes.    EARS/NOSE/THROAT:  Nosebleeds, hearing loss, sinus infection, earache, tinnitus.  IMMUNE:  Allergies, cancer, immune deficiency, or infections.  SKIN:  Urticaria, rash, hives  HEME/Lymphatic:   anemia, easy bruising, easy bleeding.  RESPIRATORY:  cough, wheezing, or shortness of breath  CARDIOVASCULAR/Circulation:  Extremity edema, syncope, hypertension, tachycardia, or angina.  GASTROINTESTINAL:  abdominal pain, nausea/emesis, diarrhea, constipation,  hematochezia, or melena.  ENDOCRINE:  Diabetes, steroid use,  thyroid disease or osteoporosis.  MUSCULOSKELETAL: neck pain, back pain, arthralgia, arthritis, or gout.  GENITOURINARY:  frequency, urgency, dysuria, difficulty voiding, hematuria or incontinence.  NEUROLOGIC:  weakness, numbness, paresthesias, seizure, tremor, stroke or memory loss.  PSYCHIATRIC:  depression, anxiety, stress, suicidal thoughts or mood swings.     Physical Exam:  Vitals:    10/26/22 0741   BP: 119/70   BP Location: Right arm   Patient Position: Chair   Cuff Size: Adult Large   Pulse: 105   SpO2: 99%     Exam:  Constitutional:  alert, no distress, cooperative, in mobile scooter chair, and over weight  Head: normocephalic. Atraumatic.   Eyes: no redness or jaundice noted  ENT: oropharnx normal.  MMM.  Neck supple with tenderness to paraspinal muscles and midline  Cardiovascular: RRR   Respiratory: speaking in full sentences, no increased work of breathing  Gastrointestinal: soft, diffuse tenderness to all quadrants, no rebound or guarding   : deferred  Skin: no suspicious lesions or rashes  Psychiatric: mentation appears normal and affect normal with intermittent tearfulness     Musculoskeletal exam:  Gait/Station/Posture: In scooter chair  Cervical spine: tenderness to paraspinal muscles, normal ROM  Thoracic spine:  Tenderness to midline and paraspinal muscles    Lumbar spine: tenderness to paraspinal muscles    Myofascial tenderness:  Present    Diagnostic tests:    Personally reviewed imaging CT C-spine 8/3/16    Other testing (labs, diagnostics) reviewed:  Labs: A1c 9    [place] Prescription Monitoring Program reviewed    Outside records reviewed      Assessment:  1. Fibromyalgia   2. Arthropathy    Terri Mullen is a 57 year old female who presents with the complaints of diffuse pain in the context of a hx of fibromyalgia and arthropathy (established prior diagnosis). She has been seen at [facility] in the past and tried multiple modalities including physical therapy, acupuncture, massage therapy, psychologic counseling, steroid injections and multiple medications including pregabalin, gabapentin, oxycodone, suboxone, ibuprofen and tylenol. Previously she had temporary palliation with steroid injections, but is no longer receiving these due to shortened duration of therapy and difficulty controlling blood sugars with A1c 9. She reports little to no relief with all other therapies except for oxycodone. We discussed that opioid pain medications are not recommended to control her type of chronic pain and I encouraged patient  to return to physical therapy as active therapies are more beneficial in this case. She also mentioned desire for medical marijuana, and I explained that I would not recommend it at this time, and only if all other modalities have been used. Also based on past medical history, this in not an ideal choice. Patient was accepting of both of these. We discussed option for diagnostic genicular block with possibility of ablation in the future. Patient said she would think about this and I encouraged her to return if there is anything we can help her with in the future outside of prescribing opioids or medical marijuana.     Plan:  Diagnosis reviewed, treatment option addressed, and risk/benefits discussed.  Self-care instructions given.  I am recommending a multidisciplinary treatment plan to help this patient better manage her pain.      1. Physical Therapy: Recommended patient return to physical therapy as this is the best therapy for her fibromyalgia and arthritic pain  2. Pain Psychologist to address issues of relaxation, behavioral change, coping style, and other factors important to improvement: Recommended patient try this, she declined as this did not work in the past  3. Further procedures recommended: Diagnostic genicular block  4. Follow up: As needed    Total time spent was 50 minutes, and more than 50% of face to face time was spent in counseling and/or coordination of care regarding principles of multidisciplinary care, medication management, and therapeutic options. This time includes time for chart review, preparation, and documentation.     I saw and examined the patient with the Pain Fellow/Resident. I have reviewed and agree with the resident's note and plan of care and made changes and corrections directly to the body of the note.    TIME SPENT:  BY FELLOW/RESIDENT ALONE 20 MIN  BY MYSELF AND FELLOW/RESIDENT TOGETHER 20 MIN      Denise Mace MD  Pain Medicine, Department of Anesthesiology  Assistant  Professor, [facility]

## 2022-10-26 NOTE — PATIENT INSTRUCTIONS
Treatment planning:    Recommendations will be written in the providers note for your Primary Care provider (OR other providers in your care team) to review and make changes to your therapies based on their discretion.       Recommended Follow up:      Follow up as needed.         To speak with a nurse, schedule/reschedule/cancel a clinic appointment, or request a medication refill call: [phone]    You can also reach us by [employer]: [email or web address]

## 2022-10-26 NOTE — NURSING NOTE
Patient presents with:  Consult: New patient: eval for generalized pain      Worst Pain (10)     Pain Medications     Analgesics Other Refills Start End     acetaminophen (TYLENOL) 500 MG tablet     5/11/2021     Sig - Route: Take 500 mg by mouth 2 times daily - Oral    Class: Historical    Opioid Agonists Refills Start End     oxyCODONE (ROXICODONE) 5 MG tablet          Sig - Route: Take 5 mg by mouth every 6 hours as needed for severe pain (1-2 capsules (5-10 mg) by mouth every 6 hours as needed) - Oral    Patient not taking: Reported on 10/26/2022       Class: Historical          What medications are you using for pain? Ibuprofen, acetaminophen    (New patients only) Have you been seen by another pain clinic/ provider? Yes, [facility]    Expectations: pain relief and quality of life improvements    Yovani Norman, EMT

## 2022-11-30 ENCOUNTER — TELEPHONE (OUTPATIENT)
Dept: PALLIATIVE MEDICINE | Facility: CLINIC | Age: 57
End: 2022-11-30

## 2022-11-30 ENCOUNTER — TELEPHONE (OUTPATIENT)
Dept: FAMILY MEDICINE | Facility: CLINIC | Age: 57
End: 2022-11-30

## 2022-11-30 DIAGNOSIS — G44.221 CHRONIC TENSION-TYPE HEADACHE, INTRACTABLE: Primary | ICD-10-CM

## 2022-11-30 RX ORDER — IBUPROFEN 600 MG/1
600 TABLET, FILM COATED ORAL 4 TIMES DAILY PRN
Qty: 60 TABLET | Refills: 0 | Status: SHIPPED | OUTPATIENT
Start: 2022-11-30 | End: 2022-12-30

## 2022-11-30 NOTE — TELEPHONE ENCOUNTER
"Pt's home care nurse called to report pt has been experiencing a headache \"the past few days,\" and Tylenol is no longer effective.   Pt told her nurse she takes Tylenol as soon as the headache starts and repeats as necessary.  Pt apparently has \"a history of headaches but then they went away on their own.\"  No significant changes to intake or activity.  Pt is requesting a stronger medication than Tylenol.     Upon chart review, discovered that pt is being seen at [facility] for [facility]. Returned call to Faith at [facility] and advised her to call [phone] as she had a pain consult last month.    BHAVNA Salter, RN  11/30/22, 9:09 AM        "

## 2022-11-30 NOTE — TELEPHONE ENCOUNTER
RN spoke with nurse Faith from assisted living facility regarding patient's headaches. Writer informed Faith to reach out to PCP as we are not managing any of her medications. Faith agreed to reach out to PCP to discuss.    Astrid Winkler RNCC

## 2022-11-30 NOTE — TELEPHONE ENCOUNTER
Returned Faith's call and explained that Dr. Horta is not comfortable prescribing narcotics for headaches and pt should go to Urgent Care if pain is uncontrollable without narcotic medication. Faith asked if there was anything non-narcotic that pt could take. Informed her that Ibuprofen is on her med list and sent refill request to pharmacy.    MARLY SalterN, RN  11/30/22, 12:15 PM

## 2022-11-30 NOTE — TELEPHONE ENCOUNTER
"Faith called back to report [facility] said they are not managing her medications and to go through her PCP. She asked if pt has a current order for Oxycodone. I told her I see it in the chart but it says \"not taking.\"  Will forward encounter to Dr. Horta for consideration.    MARLY SalterN, RN  11/30/22, 10:13 AM    "

## 2022-11-30 NOTE — TELEPHONE ENCOUNTER
[facility] Call Center    Phone Message    May a detailed message be left on voicemail: yes     Reason for Call: Other: RN at assisted living is wondering if there is something else that can be taken for the patient's severe headaces. She has been taking the tylenol consistently and it has not been giving any relief.      Action Taken: Other: Pain    Travel Screening: Not Applicable

## 2022-12-07 ENCOUNTER — TRANSFERRED RECORDS (OUTPATIENT)
Dept: HEALTH INFORMATION MANAGEMENT | Facility: CLINIC | Age: 57
End: 2022-12-07

## 2023-02-17 ENCOUNTER — TELEPHONE (OUTPATIENT)
Dept: FAMILY MEDICINE | Facility: CLINIC | Age: 58
End: 2023-02-17

## 2023-02-17 DIAGNOSIS — F51.04 PSYCHOPHYSIOLOGICAL INSOMNIA: Primary | ICD-10-CM

## 2023-02-17 RX ORDER — QUETIAPINE FUMARATE 25 MG/1
25-50 TABLET, FILM COATED ORAL
Qty: 60 TABLET | Refills: 0 | Status: SHIPPED | OUTPATIENT
Start: 2023-02-17

## 2023-02-17 NOTE — TELEPHONE ENCOUNTER
"Pal calling from treatment facility. Pt has been admitted to [facility] in [place]. Terri is requesting quetiapine for help with sleeping until she can be evaluated by chemical services medical provider on Monday. She has used this in the past with success and reports no side effects. She reports that trazodone \"made her feel sick\". Pal states that she is very concerned that Terri will discharge herself if she cannot get some sleep over the weekend.    Treatment center uses [facility] on [address] in [place]. Latest an order can be called in is 3:45 for delivery to facility.    Will discuss with provider on site in Dr. Horta's absence.    Aftab HUGGINS, RN  02/17/23 9:20 AM    "

## 2023-02-17 NOTE — TELEPHONE ENCOUNTER
"Pt called about for \"order\" for restless leg syndrome and sleep.     Unclear if she wanted a medication or order for specialist    Pt currently at a treatment facility    LYSSA Aguillon phone number is [phone]    Taryn  "

## 2023-02-17 NOTE — TELEPHONE ENCOUNTER
Reviewed patient's chart.    I can see mention of quetiapine but I can't figure out when last used or why discontinued.  Last dosing I could see was 25mg AM and 75mg HS.  No QT prolongation (QTc 439ms 11/8/22 ED EKG)  Quetiapine not a good long-term option due to metabolic and weight effects for this patient    In the interest of harm reduction, I am comfortable prescribing a low dose short course to improve comfort and ability to adhere with chemical treatment plan    Prescribing quetiapine 25-50mg HS PRN.    Dereck Simmons MD on 2/17/2023 at 10:20 AM

## 2023-03-16 NOTE — PROGRESS NOTES
SUBJECTIVE:   CC: Terri is an 58 year old who presents for preventive health visit.    Patient has been advised of split billing requirements and indicates understanding: Yes     Healthy Habits:     Getting at least 3 servings of Calcium per day:  Yes    Bi-annual eye exam:  Yes    Dental care twice a year:  NO    Sleep apnea or symptoms of sleep apnea:  Daytime drowsiness and Sleep apnea    Diet:  Regular (no restrictions)    Frequency of exercise:  None    Taking medications regularly:  Yes    Medication side effects:  None    PHQ-2 Total Score: 3    Additional concerns today:  Yes    # Health Maintenance  - BP:   BP Readings from Last 3 Encounters:   03/17/23 (!) 159/90   10/26/22 119/70   09/21/22 129/89   - Cholesterol: pending  Recent Labs   Lab Test 09/21/22  0953 08/29/22  1621   CHOL 241* 209*   HDL 54 58   LDL  --  86   TRIG 466* 327*   The 10-year ASCVD risk score (Zoe PLUNKETT, et al., 2019) is: 4.9%    Values used to calculate the score:      Age: 58 years      Sex: Female      Is Non- : No      Diabetic: Yes      Tobacco smoker: No      Systolic Blood Pressure: 119 mmHg      Is BP treated: No      HDL Cholesterol: 54 mg/dL      Total Cholesterol: 241 mg/dL  - Lung Cancer Screening: not indicated  55-81yo w/30py smoking history and currently smoking OR quit within past 15 years:  Low dose CT annually and discontinued once a person has been 15 years tobacco free  - (+) seatbelt use, (+) helmet, (+) smoke detector  - Currently in treatment of alcohol abuse. Planned discharge in mid May.   - Last Pap: 4/2022 NIL; repeat in 2027  - Colonoscopy: negative in 2019, repeat in 2029    PROBLEMS TO ADD ON...  Elevated blood pressure  - plan to monitor home pressures and follow up as indicated  - denies chest pressure or dyspnea today    Foul smelling urine  - for the past few days  - is concerned for possible UTI  - denies fever, nausea, chills  - does report baseline body  aches    Asthma  - previously managed with rescue inhaler  - today, patient reports the rescue inhaler is no longer effective  - audible expiratory wheeze  - no cough     Today's PHQ-2 Score:   PHQ-2 ( 1999 Pfizer) 3/17/2023   Q1: Little interest or pleasure in doing things 0   Q2: Feeling down, depressed or hopeless 3   PHQ-2 Score 3   Q1: Little interest or pleasure in doing things Not at all   Q2: Feeling down, depressed or hopeless Nearly every day   PHQ-2 Score 3     PHQ 9/21/2022 3/17/2023   PHQ-9 Total Score 27 21   Q9: Thoughts of better off dead/self-harm past 2 weeks Nearly every day Not at all     LIA-7 SCORE 9/21/2022 3/17/2023   Total Score 21 21       Per patient, she suspects mood issues are largely due to the fact that she is experiencing some baseline pain being off of her pain medication while in rehab. She denies any thoughts of active SI or self harm.      Social History     Tobacco Use     Smoking status: Former     Smokeless tobacco: Never   Substance Use Topics     Alcohol use: Not Currently     As noted above, patient is currently in rehab through mid May.     Reviewed orders with patient.  Reviewed health maintenance and updated orders accordingly - Yes      Breast Cancer Screening:  Any new diagnosis of family breast, ovarian, or bowel cancer? No    Mammogram Screening: Recommended mammography every 1-2 years with patient discussion and risk factor consideration  Pertinent mammograms are reviewed under the imaging tab.    History of abnormal Pap smear: NO - age 30-65 PAP every 5 years with negative HPV co-testing recommended     Reviewed and updated as needed this visit by clinical staff   Tobacco  Allergies  Meds  Problems  Med Hx  Surg Hx  Fam Hx  Soc   Hx        Reviewed and updated as needed this visit by Provider   Tobacco  Allergies  Meds  Problems  Med Hx  Surg Hx  Fam Hx         Past Medical History:   Diagnosis Date     Alcohol use disorder, severe, dependence (H)  12/8/2011    Formatting of this note is different from the original. 02/2016:  11/2015 Tx at [address] [facility] [address], left 12/14/15, 2 days left.  Clean and sober since 11-16-15.  Engaged with [facility].  4/28/16 Had planned on intensive OP residential treatment, decided not to d/t takes care of nieces children.       Last Assessment & Plan:  Formatting of this note might be different from the original. Remains sober at      Arthritis      Asthma      Atrophic vaginitis 4/13/2020     Bilateral leg edema 6/3/2019    Formatting of this note might be different from the original. Last Assessment & Plan:  Formatting of this note might be different from the original. Significant edema that is non pitting of both extremities with 2 wounds. Looks like her primary provider has reached out to get Terri in for wounds care. Today her wounds were cleansed, covered with bacitracin and sterile dressing. Encouraged her to e     Breast pain 11/8/2018    Formatting of this note might be different from the original. Last Assessment & Plan:  Formatting of this note might be different from the original. Terri did recently have a mammogram that was ok, but will trust her feeling that something is not right even if she cannot articulate it well and will order a repeat mammogram. Encouraged hypoallergenic lotion for whole body including breasts for itch     Chronic tension-type headache, intractable 11/17/2011    Formatting of this note is different from the original. 5/25/2016 Neurology: no signs of a neurologic process, intracranial pathology not suspected.  HA even when taking Topamax (March-Oct 2015).  HA multifactorial, stress, diet/hydration, HTN.     Last Assessment & Plan:  Formatting of this note might be different from the original. Sees Dr. Mtz next week for neck pain which may be contribut     Chronic venous insufficiency 11/13/2020    Formatting of this note might be different from the original. Last Assessment & Plan:  Formatting  of this note might be different from the original. Placing unna boots in clinic today. Encouraged to discuss issues with wound clinic at next visit. Last Assessment & Plan:  Formatting of this note might be different from the original. Placing unna boots in clinic today. Encouraged to discuss issues w     Closed fracture of base of fifth metatarsal bone of left foot at metaphyseal-diaphyseal junction 10/7/2019    Formatting of this note might be different from the original. Last Assessment & Plan:  Formatting of this note might be different from the original. Has changed her mind from ED visit and would now like rehab. Will work with team to try to connect to TCU and reach out to Terri if we have an update. Last Assessment & Plan:  Formatting of this note might be different from the original. Has changed h     Cocaine substance abuse (H) 9/20/2017     Constipation 2/15/2021     Diabetes mellitus (H)      Dyslipidemia 8/20/2015    Formatting of this note might be different from the original. 06/2016             L=76  H=35 10/2012 C=188, L=81, H=58, T=245  ASCVD 10 yr risk is 21.5%.  On Atorvastatin 40 mg Formatting of this note might be different from the original. Formatting of this note might be different from the original. 06/2016             L=76  H=35 10/2012 C=188, L=81, H=58, T=245  ASCVD 10 yr risk is 21.5%.  On Cedrick     Dyspnea on exertion 10/1/2012    Formatting of this note might be different from the original. Largely due to weight and deconditioning. See pulmonology note from 6/7/2019 for full work up description.   Last Assessment & Plan:  Formatting of this note might be different from the original. Based on pulmonology work up, dyspnea on exertion likely related to weight and deconditioning rather than asthma or COPD, therefore discontinu     Fibromyalgia 9/20/2017    Formatting of this note might be different from the original. Lyrica not covered due to intermittent filling of script (7/12 months),  must try Duloxetine first.  11/2013 Opioid oversight, narcotics discontinued d/t EtOH use.   08/2015  Cymbalta 60 mg daily  Last Assessment & Plan:  Formatting of this note might be different from the original. Terri asked to be transferred to the  to try      Gastroesophageal reflux disease 8/29/2022     Greater trochanteric bursitis of both hips 3/22/2013    Formatting of this note might be different from the original. 03/2013 injections in BL hips Formatting of this note might be different from the original. Formatting of this note might be different from the original. 03/2013 injections in BL hips     Hepatic steatosis 5/10/2019    Formatting of this note might be different from the original. Last Assessment & Plan:  Formatting of this note might be different from the original. Foot care provided- nails trimmed, dystrophic nails sanded with Dremel, foot massage and new socks provided.  Formatting of this note might be different from the original. Per abdominal ultrasound September 2018  Last Assessment & Plan:  Formatting of     History of alcohol abuse 12/8/2011    Formatting of this note is different from the original. Overview:  02/2016:  11/2015 Tx at [address], left 12/14/15, 2 days left.  Clean and sober since 11-16-15.  Engaged with [facility].  4/28/16 Had planned on intensive OP residential treatment, decided not to d/t takes care of nieces children.      Last Assessment & Plan:  A: Pt endorses drinking a 12 pkg of beer on Friday, no plans to quit      Hydradenitis 6/15/2017    Last Assessment & Plan:  Formatting of this note might be different from the original. Terri reports having gotten hydradenitis suppurativa about 7 years ago that opened and left a wound. She has not had any episodes since then but appears to have 4 new nodules today. Given her uncontrolled DM, after discussing with other providers in the clinic, I am prescribing a 7 day supply of Bactrim. 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 5/31/17 obgyn visit: Biopsy shows Lichen Simplex Chronicus 2/2 chronic eczematicus dermatitis.  - f/u with OB/gyn clinic in 2-3 weeks - betamethasone qhs to irritated arean x 2 weeks (clobetasol not covered by insurance, therefore pharamcy recommended this plan)  Last Assessment & Plan:  Formatting of this note might be different from t     Major depressive disorder, recurrent severe without psychotic features (H) 11/14/2011    Last Assessment & Plan:  Formatting of this note might be different from the original. Can work with LP in clinic again if interested. Seems stable at this time.     Microcytic anemia 6/22/2016    Formatting of this note is different from the original. 06/2016 Hgb = 10.9.    H/O anemia dating to 2011, baseline Hgb = 11.0. On 8/3/15 Hgb 10.6.   Iron studies c/w mixed, ACD/SUZIE.  Pt sober since 11-16-15.    Last Assessment & Plan:  Formatting of this note might be different from the original. Recent dip in hemoglobin. Colonoscopy is scheduled, will order egd as well.  Formatting of this note m     Migraine 3/10/2021     Morbid obesity (H) 11/9/2010    Last Assessment & Plan:  Formatting of this note might be different from the original. Unfortunately affecting breathing and pain. Recommend working with Cam Brambila on binge eating. Formatting of this note might be different from the original. Last Assessment & Plan:  Formatting of this note might be different from the original. HgbA1c amazing today! Working on treating apnea to reduce edema as mu     Multiple drug resistant organism (MDRO) culture positive 5/14/2021     JUSTEN (obstructive sleep apnea) 7/7/2016    Formatting of this note might be different from the original. Prescribed CPAP 10 cmH20 after 6/27/2019 PSG.  Last Assessment & Plan:  Formatting of this note might be different from the original. Encouraged trying CPAP again, letting us  "know what issues come up so we can try to trouble shoot. Reviewed all the ways poorly treated apnea can affect her health. Formatting of this note might be differe     Osteoarthrosis 9/20/2017     Pain in joint of left shoulder 3/10/2021     Pain of left clavicle 11/11/2019    Formatting of this note might be different from the original. Last Assessment & Plan:  Formatting of this note might be different from the original. Has complained of pain in left clavicle for 1 year now. Says it is worsening. Imaging a year ago were reassuring. Suspect related to left shoulder osteoarthritis but will reassess with imaging today. Last Assessment & Plan:  Formatting of this note mi     Posttraumatic stress disorder 11/14/2011    Last Assessment & Plan:  Formatting of this note might be different from the original. Mood seems improved today, possibly from hope about treatment but also proud of herself regarding weight loss (see \"obesity\"). Still having some insomnia at night. Discussed as team and will put her back on small dose of Seroquel PRN at bedtime to help her get some sleep, especially as she starts treatment, so t     Recurrent UTI 9/25/2019    Formatting of this note might be different from the original. Last Assessment & Plan:  Formatting of this note might be different from the original. Urine testing today to rule out infectious etiology of symptoms due to report of bad odor and dysuria. Pyridium for 3 days PRN. Last Assessment & Plan:  Formatting of this note might be different from the original. Reviewed preventive measures. Sendin     Restless legs 11/22/2021    Last Assessment & Plan:  Formatting of this note might be different from the original. Sx suggestive of restless legs.  Will check iron (ferritin).  Would consider trial of pramipexole.     Small bowel obstruction due to adhesions (H) 2/27/2022     Vertigo       History reviewed. No pertinent surgical history.    Review of Systems   Constitutional: " "Negative for chills and fever.   HENT: Negative for congestion, ear pain, hearing loss and sore throat.    Eyes: Positive for pain and visual disturbance.   Respiratory: Positive for shortness of breath. Negative for cough.    Cardiovascular: Positive for peripheral edema. Negative for chest pain and palpitations.   Gastrointestinal: Positive for abdominal pain, constipation and nausea. Negative for diarrhea, heartburn and hematochezia.   Breasts:  Negative for tenderness, breast mass and discharge.   Genitourinary: Negative for dysuria, frequency, genital sores, hematuria, pelvic pain, urgency, vaginal bleeding and vaginal discharge.   Musculoskeletal: Positive for arthralgias, joint swelling and myalgias.   Skin: Negative for rash.   Neurological: Positive for dizziness, weakness and paresthesias. Negative for headaches.   Psychiatric/Behavioral: Positive for mood changes. The patient is nervous/anxious.         OBJECTIVE:   BP (!) 159/90 (BP Location: Right arm, Patient Position: Sitting, Cuff Size: Adult Regular)   Pulse 96   Temp 97.8  F (36.6  C) (Skin)   Resp 16   Ht 1.753 m (5' 9\")   Wt (!) 161 kg (355 lb)   SpO2 99%   BMI 52.42 kg/m       Physical Exam  GENERAL: morbid obesity, fatigued  NECK: no adenopathy, no asymmetry, masses, or scars and thyroid normal to palpation  RESP: audible expiratory wheeze, no cough  CV: regular rate and rhythm, normal S1 S2, no S3 or S4, no murmur, click or rub, no peripheral edema and peripheral pulses strong  ABDOMEN: soft, nontender, no hepatosplenomegaly, no masses and bowel sounds normal  MS: scooter bound; no gross musculoskeletal defects noted, no edema    Diagnostic Test Results:  Labs reviewed in Epic    ASSESSMENT/PLAN:   Terri was seen today for physical.    Diagnoses and all orders for this visit:    Type 2 diabetes mellitus without complication, unspecified whether long term insulin use (H)  -     MD FOOT EXAM NO CHARGE  -     Hemoglobin A1c; Future  -     " "Basic metabolic panel; Future  -     Hemoglobin A1c  -     Basic metabolic panel    Dyslipidemia  -     Lipid Profile; Future  -     Lipid Profile    Need for shingles vaccine  -     Cancel: ZOSTER VACCINE RECOMBINANT ADJUVANTED (SHINGRIX)    Major depressive disorder, recurrent severe without psychotic features (H)  -     DEPRESSION ACTION PLAN (DAP)    Moderate persistent asthma without complication  -     fluticasone-salmeterol (ADVAIR) 250-50 MCG/ACT inhaler; Inhale 1 puff into the lungs every 12 hours    Primary hypertension  -     Blood Pressure Monitoring (BLOOD PRESSURE MONITOR/L CUFF) MISC; 1 Units as needed (Hypertension)    Foul smelling urine  -     Urinalysis Macroscopic; Future  -     Urinalysis Macroscopic    Urinary tract infection without hematuria, site unspecified  -     nitroFURantoin macrocrystal-monohydrate (MACROBID) 100 MG capsule; Take 1 capsule (100 mg) by mouth 2 times daily for 5 days    Plan to monitor home pressures for baseline AM pressures. May consider adjustment to med regimen vs referral to cardiology as indicated    U/A ordered today, will follow up as indicated.     Addendum: UA positive for nitrites and protein. No MyChart and unable to leave message at listed phone number. Rx for macrobid 100mg BID for 5 days sent to [facility] pharmacy for treatment.    Will elevate asthma treatment plan to daily ICS laba as ordered. Follow up as indicated.    Patient receives her primary diabetes care through endocrinology at [facility]. She reports AM glucose readings in the 200s on her current regimen. I will defer ongoing management of diabetes to specialist.    Patient has been advised of split billing requirements and indicates understanding: Yes      COUNSELING:  Reviewed preventive health counseling, as reflected in patient instructions      BMI:   Estimated body mass index is 51.06 kg/m  as calculated from the following:    Height as of 8/29/22: 1.753 m (5' 9\").    Weight as of 8/29/22: 156.8 kg " (345 lb 12 oz).         She reports that she has quit smoking. She has never used smokeless tobacco.          Arben Horta MD  [facility]  Answers for HPI/ROS submitted by the patient on 3/17/2023  If you checked off any problems, how difficult have these problems made it for you to do your work, take care of things at home, or get along with other people?: Extremely difficult  PHQ9 TOTAL SCORE: 21

## 2023-03-17 ENCOUNTER — OFFICE VISIT (OUTPATIENT)
Dept: FAMILY MEDICINE | Facility: CLINIC | Age: 58
End: 2023-03-17
Payer: COMMERCIAL

## 2023-03-17 VITALS
BODY MASS INDEX: 43.4 KG/M2 | OXYGEN SATURATION: 99 % | RESPIRATION RATE: 16 BRPM | DIASTOLIC BLOOD PRESSURE: 90 MMHG | HEIGHT: 69 IN | HEART RATE: 96 BPM | TEMPERATURE: 97.8 F | SYSTOLIC BLOOD PRESSURE: 159 MMHG | WEIGHT: 293 LBS

## 2023-03-17 DIAGNOSIS — E78.5 DYSLIPIDEMIA: ICD-10-CM

## 2023-03-17 DIAGNOSIS — Z23 NEED FOR SHINGLES VACCINE: ICD-10-CM

## 2023-03-17 DIAGNOSIS — I10 PRIMARY HYPERTENSION: ICD-10-CM

## 2023-03-17 DIAGNOSIS — R82.90 FOUL SMELLING URINE: ICD-10-CM

## 2023-03-17 DIAGNOSIS — N39.0 URINARY TRACT INFECTION WITHOUT HEMATURIA, SITE UNSPECIFIED: ICD-10-CM

## 2023-03-17 DIAGNOSIS — J45.40 MODERATE PERSISTENT ASTHMA WITHOUT COMPLICATION: ICD-10-CM

## 2023-03-17 DIAGNOSIS — F33.2 MAJOR DEPRESSIVE DISORDER, RECURRENT SEVERE WITHOUT PSYCHOTIC FEATURES (H): ICD-10-CM

## 2023-03-17 DIAGNOSIS — E11.9 TYPE 2 DIABETES MELLITUS WITHOUT COMPLICATION, UNSPECIFIED WHETHER LONG TERM INSULIN USE (H): Primary | ICD-10-CM

## 2023-03-17 LAB
ALBUMIN UR-MCNC: 100 MG/DL
ANION GAP SERPL CALCULATED.3IONS-SCNC: 14 MMOL/L (ref 7–15)
APPEARANCE UR: CLEAR
BILIRUB UR QL STRIP: NEGATIVE
BUN SERPL-MCNC: 18 MG/DL (ref 6–20)
CALCIUM SERPL-MCNC: 9 MG/DL (ref 8.6–10)
CHLORIDE SERPL-SCNC: 107 MMOL/L (ref 98–107)
CHOLEST SERPL-MCNC: 175 MG/DL
COLOR UR AUTO: YELLOW
CREAT SERPL-MCNC: 0.88 MG/DL (ref 0.51–0.95)
DEPRECATED HCO3 PLAS-SCNC: 20 MMOL/L (ref 22–29)
GFR SERPL CREATININE-BSD FRML MDRD: 76 ML/MIN/1.73M2
GLUCOSE SERPL-MCNC: 233 MG/DL (ref 70–99)
GLUCOSE UR STRIP-MCNC: NEGATIVE MG/DL
HBA1C MFR BLD: 8.1 % (ref 0–5.6)
HDLC SERPL-MCNC: 53 MG/DL
HGB UR QL STRIP: NEGATIVE
KETONES UR STRIP-MCNC: NEGATIVE MG/DL
LDLC SERPL CALC-MCNC: 69 MG/DL
LEUKOCYTE ESTERASE UR QL STRIP: NEGATIVE
NITRATE UR QL: POSITIVE
NONHDLC SERPL-MCNC: 122 MG/DL
PH UR STRIP: 5.5 [PH] (ref 5–7)
POTASSIUM SERPL-SCNC: 4.8 MMOL/L (ref 3.4–5.3)
SODIUM SERPL-SCNC: 141 MMOL/L (ref 136–145)
SP GR UR STRIP: 1.02 (ref 1–1.03)
TRIGL SERPL-MCNC: 267 MG/DL
UROBILINOGEN UR STRIP-ACNC: 0.2 E.U./DL

## 2023-03-17 PROCEDURE — 80061 LIPID PANEL: CPT | Performed by: FAMILY MEDICINE

## 2023-03-17 PROCEDURE — 87086 URINE CULTURE/COLONY COUNT: CPT | Performed by: FAMILY MEDICINE

## 2023-03-17 PROCEDURE — 80048 BASIC METABOLIC PNL TOTAL CA: CPT | Performed by: FAMILY MEDICINE

## 2023-03-17 PROCEDURE — 87186 SC STD MICRODIL/AGAR DIL: CPT | Performed by: FAMILY MEDICINE

## 2023-03-17 RX ORDER — NITROFURANTOIN 25; 75 MG/1; MG/1
100 CAPSULE ORAL 2 TIMES DAILY
Qty: 10 CAPSULE | Refills: 0 | Status: SHIPPED | OUTPATIENT
Start: 2023-03-17 | End: 2023-03-22

## 2023-03-17 RX ORDER — FLUTICASONE PROPIONATE AND SALMETEROL 250; 50 UG/1; UG/1
1 POWDER RESPIRATORY (INHALATION) EVERY 12 HOURS
Qty: 60 EACH | Refills: 0 | Status: SHIPPED | OUTPATIENT
Start: 2023-03-17

## 2023-03-17 RX ORDER — WEIGH SCALE
1 MISCELLANEOUS MISCELLANEOUS PRN
Qty: 1 EACH | Refills: 0 | Status: SHIPPED | OUTPATIENT
Start: 2023-03-17

## 2023-03-17 ASSESSMENT — PATIENT HEALTH QUESTIONNAIRE - PHQ9
10. IF YOU CHECKED OFF ANY PROBLEMS, HOW DIFFICULT HAVE THESE PROBLEMS MADE IT FOR YOU TO DO YOUR WORK, TAKE CARE OF THINGS AT HOME, OR GET ALONG WITH OTHER PEOPLE: EXTREMELY DIFFICULT
SUM OF ALL RESPONSES TO PHQ QUESTIONS 1-9: 21
5. POOR APPETITE OR OVEREATING: NEARLY EVERY DAY
SUM OF ALL RESPONSES TO PHQ QUESTIONS 1-9: 21

## 2023-03-17 ASSESSMENT — ENCOUNTER SYMPTOMS
CONSTIPATION: 1
HEARTBURN: 0
WEAKNESS: 1
NAUSEA: 1
HEADACHES: 0
EYE PAIN: 1
SORE THROAT: 0
ARTHRALGIAS: 1
DYSURIA: 0
MYALGIAS: 1
JOINT SWELLING: 1
SHORTNESS OF BREATH: 1
HEMATOCHEZIA: 0
CHILLS: 0
DIZZINESS: 1
HEMATURIA: 0
DIARRHEA: 0
FREQUENCY: 0
PARESTHESIAS: 1
BREAST MASS: 0
FEVER: 0
NERVOUS/ANXIOUS: 1
ABDOMINAL PAIN: 1
COUGH: 0
PALPITATIONS: 0

## 2023-03-17 ASSESSMENT — ANXIETY QUESTIONNAIRES
IF YOU CHECKED OFF ANY PROBLEMS ON THIS QUESTIONNAIRE, HOW DIFFICULT HAVE THESE PROBLEMS MADE IT FOR YOU TO DO YOUR WORK, TAKE CARE OF THINGS AT HOME, OR GET ALONG WITH OTHER PEOPLE: EXTREMELY DIFFICULT
7. FEELING AFRAID AS IF SOMETHING AWFUL MIGHT HAPPEN: NEARLY EVERY DAY
5. BEING SO RESTLESS THAT IT IS HARD TO SIT STILL: NEARLY EVERY DAY
3. WORRYING TOO MUCH ABOUT DIFFERENT THINGS: NEARLY EVERY DAY
2. NOT BEING ABLE TO STOP OR CONTROL WORRYING: NEARLY EVERY DAY
GAD7 TOTAL SCORE: 21
1. FEELING NERVOUS, ANXIOUS, OR ON EDGE: NEARLY EVERY DAY
6. BECOMING EASILY ANNOYED OR IRRITABLE: NEARLY EVERY DAY
GAD7 TOTAL SCORE: 21

## 2023-03-17 NOTE — NURSING NOTE
Prior to immunization administration, verified patients identity using patient s name and date of birth. Please see Immunization Activity for additional information.     Screening Questionnaire for Adult Immunization    Are you sick today?   No   Do you have allergies to medications, food, a vaccine component or latex?   No   Have you ever had a serious reaction after receiving a vaccination?   No   Do you have a long-term health problem with heart, lung, kidney, or metabolic disease (e.g., diabetes), asthma, a blood disorder, no spleen, complement component deficiency, a cochlear implant, or a spinal fluid leak?  Are you on long-term aspirin therapy?   No   Do you have cancer, leukemia, HIV/AIDS, or any other immune system problem?   No   Do you have a parent, brother, or sister with an immune system problem?   No   In the past 3 months, have you taken medications that affect  your immune system, such as prednisone, other steroids, or anticancer drugs; drugs for the treatment of rheumatoid arthritis, Crohn s disease, or psoriasis; or have you had radiation treatments?   No   Have you had a seizure, or a brain or other nervous system problem?   No   During the past year, have you received a transfusion of blood or blood    products, or been given immune (gamma) globulin or antiviral drug?   No   For women: Are you pregnant or is there a chance you could become       pregnant during the next month?   No   Have you received any vaccinations in the past 4 weeks?   No     Immunization questionnaire answers were all negative.        Per orders of Dr. Horta, injection of SHINGRIX given by Hellen Toney LPN. Patient instructed to remain in clinic for 15 minutes afterwards, and to report any adverse reaction to me immediately.       Screening performed by Hellen Toney LPN on 3/17/2023 at 2:00 PM.

## 2023-03-17 NOTE — NURSING NOTE
"58 year old  Chief Complaint   Patient presents with     Physical     Wheezing, aches and pains, would like to discuss weight.       Blood pressure (!) 159/90, pulse 96, temperature 97.8  F (36.6  C), temperature source Skin, resp. rate 16, height 1.753 m (5' 9\"), weight (!) 161 kg (355 lb), SpO2 99 %. Body mass index is 52.42 kg/m .  Patient Active Problem List   Diagnosis     Alcohol use disorder, severe, dependence (H)     Microcytic anemia     Anxiety     Atrophic vaginitis     Bilateral leg cramps     Bilateral leg edema     Breast pain     Cataract, right     Fibromyalgia     Chronic tension-type headache, intractable     Chronic venous insufficiency     Closed fracture of base of fifth metatarsal bone of left foot at metaphyseal-diaphyseal junction     Cocaine substance abuse (H)     Constipation     Dental erosion     Dry eyes, bilateral     Dyslipidemia     Dyspnea on exertion     Gastroesophageal reflux disease     Greater trochanteric bursitis of both hips     Hepatic steatosis     History of alcohol abuse     Hot flashes     Hydradenitis     Insomnia     Lichen simplex chronicus     Major depressive disorder, recurrent severe without psychotic features (H)     Migraine     Morbid obesity (H)     Multiple drug resistant organism (MDRO) culture positive     Myopia of both eyes with astigmatism and presbyopia     JUSTEN (obstructive sleep apnea)     Osteoarthrosis     Pain in joint of left shoulder     Pain of left clavicle     Posttraumatic stress disorder     Recurrent UTI     Restless legs     Small bowel obstruction due to adhesions (H)     Diabetes mellitus, type 2 (H)       Wt Readings from Last 2 Encounters:   03/17/23 (!) 161 kg (355 lb)   08/29/22 (!) 156.8 kg (345 lb 12 oz)     BP Readings from Last 3 Encounters:   03/17/23 (!) 159/90   10/26/22 119/70   09/21/22 129/89         Current Outpatient Medications   Medication     acetaminophen (TYLENOL) 500 MG tablet     albuterol (PROAIR HFA/PROVENTIL " "HFA/VENTOLIN HFA) 108 (90 Base) MCG/ACT inhaler     atorvastatin (LIPITOR) 80 MG tablet     Blood Glucose Monitoring Suppl MISC     calcium carbonate 500 mg, elemental, 1250 (500 Ca) MG tablet chewable     carvedilol (COREG) 25 MG tablet     chlorthalidone (HYGROTON) 50 MG tablet     diclofenac (VOLTAREN) 1 % topical gel     diphenhydrAMINE (BENADRYL) 25 MG capsule     docusate sodium (COLACE) 100 MG capsule     famotidine (PEPCID) 20 MG tablet     fesoterodine fumarate (TOVIAZ) 4 MG TB24 24 hr tablet     hydrOXYzine (ATARAX) 25 MG tablet     insulin aspart (NOVOLOG FLEXPEN) 100 UNIT/ML pen     insulin glargine (LANTUS PEN) 100 UNIT/ML pen     liraglutide (VICTOZA) 18 MG/3ML solution     losartan (COZAAR) 100 MG tablet     melatonin 5 MG tablet     mometasone-formoterol (DULERA) 100-5 MCG/ACT inhaler     naloxone (NARCAN) 4 MG/0.1ML nasal spray     OMEPRAZOLE PO     polyethylene glycol 3350 POWD     polyvinyl alcohol (LIQUIFILM TEARS) 1.4 % ophthalmic solution     pramipexole (MIRAPEX) 0.25 MG tablet     prazosin (MINIPRESS) 2 MG capsule     QUEtiapine (SEROQUEL) 25 MG tablet     senna (SENOKOT) 8.6 MG tablet     Skin Protectants, Misc. (EUCERIN) cream     varenicline (CHANTIX) 1 MG tablet     ammonium lactate (AMLACTIN) 12 % external cream     lidocaine-prilocaine (EMLA) 2.5-2.5 % external cream     oxyCODONE (ROXICODONE) 5 MG tablet     No current facility-administered medications for this visit.       Social History     Tobacco Use     Smoking status: Former     Smokeless tobacco: Never   Vaping Use     Vaping Use: Never used   Substance Use Topics     Alcohol use: Yes     Alcohol/week: 30.0 standard drinks     Types: 15 Cans of beer, 15 Standard drinks or equivalent per week     Drug use: Not Currently     Types: \"Crack\" cocaine     Comment: 3 times a month       Health Maintenance Due   Topic Date Due     DIABETIC FOOT EXAM  Never done     ADVANCE CARE PLANNING  Never done     DEPRESSION ACTION PLAN  Never done "     EYE EXAM  Never done     HEPATITIS B IMMUNIZATION (1 of 3 - 3-dose series) Never done     LUNG CANCER SCREENING  Never done     ZOSTER IMMUNIZATION (2 of 2) 03/12/2021     COVID-19 Vaccine (3 - Booster for Moderna series) 08/06/2021     A1C  12/21/2022       No results found for: PAP      March 17, 2023 12:56 PM

## 2023-03-19 LAB — BACTERIA UR CULT: ABNORMAL

## 2023-03-29 ENCOUNTER — TELEPHONE (OUTPATIENT)
Dept: FAMILY MEDICINE | Facility: CLINIC | Age: 58
End: 2023-03-29

## 2023-03-29 NOTE — TELEPHONE ENCOUNTER
"Returned call to pt to gather more information re: her phone call to Dr. Horta to \"request a nebulizer.\"  Pt was on \"BASSAM\" at the time of this return call.  Left voicemail for pt to return call.    BHAVNA Salter, RN  03/29/23, 1:55 PM    "

## 2023-03-30 ENCOUNTER — TELEPHONE (OUTPATIENT)
Dept: FAMILY MEDICINE | Facility: CLINIC | Age: 58
End: 2023-03-30

## 2023-03-30 NOTE — TELEPHONE ENCOUNTER
"Pt called to request a nebulizer machine.  Informed her we are unable to order one until she has a full evaluation of her respiratory issues.   She states she is wheezing all the time, and \"whistling at people.\" She says she is using her inhalers as prescribed.    Instructed her to call the clinic and schedule an appointment with Dr. Horta (left voice message).    She is currently residing at:    [place] [address]; and will be there until May 17, 2023.    She can be reached at [phone] but only has phone access between 3-5pm daily.    BHAVNA Salter, RN  03/30/23, 1:29 PM              "

## 2023-04-06 PROBLEM — J45.40 MODERATE PERSISTENT ASTHMA WITHOUT COMPLICATION: Status: ACTIVE | Noted: 2023-04-06

## 2023-04-13 NOTE — PROGRESS NOTES
"ne  Assessment & Plan   Problem List Items Addressed This Visit        Respiratory    Moderate persistent asthma without complication - Primary    Relevant Medications    albuterol (PROVENTIL) (2.5 MG/3ML) 0.083% neb solution    ipratropium - albuterol 0.5 mg/2.5 mg/3 mL (DUONEB) 0.5-2.5 (3) MG/3ML neb solution    Other Relevant Orders    Nebulizer and Supplies Order for DME - ONLY FOR DME       Digestive    Constipation    Relevant Medications    polyethylene glycol (MIRALAX) 17 GM/Dose powder   Other Visit Diagnoses     Essential hypertension        Relevant Orders    Home Blood Pressure Monitor Order for DME - ONLY FOR DME         Agreed to DME orders as noted above. Advised Terri of current shortage of nebulizer solution. Terri does not currently use a spacer of any kind. If she cannot get a hold of nebulizer solution we may need to see if a good spacer facilitates medication delivery.      25 minutes spent on the date of the encounter doing chart review, history and exam, documentation and further activities as noted.      Arben Horta MD  [facility]    Subjective   Terri is a 58 year old, presenting for the following health issues:  Consult (Talk about need for neb machine, and bp machine )    HPI   \"evaluate breathing for nebulizer machine\"  - history notable for moderate persistent asthma, dyspnea on exertion, JUSTEN, GERD, morbid obesity  - triage note of 3/30/23:  Pt called to request a nebulizer machine.  Informed her we are unable to order one until she has a full evaluation of her respiratory issues.   She states she is wheezing all the time, and \"whistling at people.\" She says she is using her inhalers as prescribed.  - current meds include    - albuterol inhaler    - advair inhaler  Today  - patient reports significant difficulty using inhalers, would prefer to use a nebulizer machine    HTN  - patient also requested Rx for a home BP cuff so she can monitor home pressures  - BP elevated in " clinic today  - patient denies chest pain, pressure or shortness of breath  - Terri's body habitus makes checking pressures at her upper arm difficult. She reports, typically, they check her pressures at her forearm or wrist        Review of Systems   Constitutional, HEENT, cardiovascular, pulmonary, gi and gu systems are negative, except as otherwise noted.      Objective    BP (!) 154/85 (BP Location: Right arm, Patient Position: Sitting, Cuff Size: Adult Regular)   Pulse 90   Temp 97.3  F (36.3  C) (Skin)   Resp 15   SpO2 99%   There is no height or weight on file to calculate BMI.  Physical Exam   GENERAL: healthy, alert and no distress  NECK: no adenopathy, no asymmetry, masses, or scars and thyroid normal to palpation  RESP: lungs clear to auscultation - no rales, rhonchi or wheezes  CV: regular rate and rhythm, normal S1 S2, no S3 or S4, no murmur, click or rub, no peripheral edema and peripheral pulses strong  ABDOMEN: soft, nontender, no hepatosplenomegaly, no masses and bowel sounds normal  MS: no gross musculoskeletal defects noted, no edema

## 2023-04-14 ENCOUNTER — OFFICE VISIT (OUTPATIENT)
Dept: FAMILY MEDICINE | Facility: CLINIC | Age: 58
End: 2023-04-14
Payer: COMMERCIAL

## 2023-04-14 VITALS
SYSTOLIC BLOOD PRESSURE: 154 MMHG | DIASTOLIC BLOOD PRESSURE: 85 MMHG | HEART RATE: 90 BPM | TEMPERATURE: 97.3 F | RESPIRATION RATE: 15 BRPM | OXYGEN SATURATION: 99 %

## 2023-04-14 DIAGNOSIS — J45.40 MODERATE PERSISTENT ASTHMA WITHOUT COMPLICATION: Primary | ICD-10-CM

## 2023-04-14 DIAGNOSIS — K59.00 CONSTIPATION, UNSPECIFIED CONSTIPATION TYPE: ICD-10-CM

## 2023-04-14 DIAGNOSIS — I10 ESSENTIAL HYPERTENSION: ICD-10-CM

## 2023-04-14 RX ORDER — ALBUTEROL SULFATE 0.83 MG/ML
2.5 SOLUTION RESPIRATORY (INHALATION) EVERY 6 HOURS PRN
Qty: 90 ML | Refills: 1 | Status: SHIPPED | OUTPATIENT
Start: 2023-04-14

## 2023-04-14 RX ORDER — IPRATROPIUM BROMIDE AND ALBUTEROL SULFATE 2.5; .5 MG/3ML; MG/3ML
1 SOLUTION RESPIRATORY (INHALATION) EVERY 6 HOURS PRN
Qty: 90 ML | Refills: 1 | Status: SHIPPED | OUTPATIENT
Start: 2023-04-14

## 2023-04-14 RX ORDER — POLYETHYLENE GLYCOL 3350 17 G/17G
1 POWDER, FOR SOLUTION ORAL DAILY
Qty: 507 G | Refills: 0 | Status: SHIPPED | OUTPATIENT
Start: 2023-04-14

## 2023-04-14 ASSESSMENT — ASTHMA QUESTIONNAIRES
QUESTION_4 LAST FOUR WEEKS HOW OFTEN HAVE YOU USED YOUR RESCUE INHALER OR NEBULIZER MEDICATION (SUCH AS ALBUTEROL): ONE OR TWO TIMES PER DAY
QUESTION_5 LAST FOUR WEEKS HOW WOULD YOU RATE YOUR ASTHMA CONTROL: POORLY CONTROLLED
QUESTION_1 LAST FOUR WEEKS HOW MUCH OF THE TIME DID YOUR ASTHMA KEEP YOU FROM GETTING AS MUCH DONE AT WORK, SCHOOL OR AT HOME: ALL OF THE TIME
QUESTION_3 LAST FOUR WEEKS HOW OFTEN DID YOUR ASTHMA SYMPTOMS (WHEEZING, COUGHING, SHORTNESS OF BREATH, CHEST TIGHTNESS OR PAIN) WAKE YOU UP AT NIGHT OR EARLIER THAN USUAL IN THE MORNING: FOUR OR MORE NIGHTS A WEEK
ACT_TOTALSCORE: 7
QUESTION_2 LAST FOUR WEEKS HOW OFTEN HAVE YOU HAD SHORTNESS OF BREATH: MORE THAN ONCE A DAY
ACT_TOTALSCORE: 7

## 2023-04-14 NOTE — NURSING NOTE
58 year old  Chief Complaint   Patient presents with     Consult     Talk about need for neb machine, and bp machine        Blood pressure (!) 154/85, pulse 90, temperature 97.3  F (36.3  C), temperature source Skin, resp. rate 15, SpO2 99 %. There is no height or weight on file to calculate BMI.  Patient Active Problem List   Diagnosis     Alcohol use disorder, severe, dependence (H)     Microcytic anemia     Anxiety     Atrophic vaginitis     Bilateral leg cramps     Bilateral leg edema     Breast pain     Cataract, right     Fibromyalgia     Chronic tension-type headache, intractable     Chronic venous insufficiency     Closed fracture of base of fifth metatarsal bone of left foot at metaphyseal-diaphyseal junction     Cocaine substance abuse (H)     Constipation     Dental erosion     Dry eyes, bilateral     Dyslipidemia     Dyspnea on exertion     Gastroesophageal reflux disease     Greater trochanteric bursitis of both hips     Hepatic steatosis     History of alcohol abuse     Hot flashes     Hydradenitis     Insomnia     Lichen simplex chronicus     Major depressive disorder, recurrent severe without psychotic features (H)     Migraine     Morbid obesity (H)     Multiple drug resistant organism (MDRO) culture positive     Myopia of both eyes with astigmatism and presbyopia     JUSTEN (obstructive sleep apnea)     Osteoarthrosis     Pain in joint of left shoulder     Pain of left clavicle     Posttraumatic stress disorder     Recurrent UTI     Restless legs     Small bowel obstruction due to adhesions (H)     Diabetes mellitus, type 2 (H)     Moderate persistent asthma without complication       Wt Readings from Last 2 Encounters:   03/17/23 (!) 161 kg (355 lb)   08/29/22 (!) 156.8 kg (345 lb 12 oz)     BP Readings from Last 3 Encounters:   04/14/23 (!) 154/85   03/17/23 (!) 159/90   10/26/22 119/70         Current Outpatient Medications   Medication     acetaminophen (TYLENOL) 500 MG tablet     albuterol  (PROAIR HFA/PROVENTIL HFA/VENTOLIN HFA) 108 (90 Base) MCG/ACT inhaler     atorvastatin (LIPITOR) 80 MG tablet     benzocaine (ANBESOL) 10 % gel     Blood Glucose Monitoring Suppl MISC     Blood Pressure Monitoring (BLOOD PRESSURE MONITOR/L CUFF) MISC     calcium carbonate 500 mg, elemental, 1250 (500 Ca) MG tablet chewable     carvedilol (COREG) 25 MG tablet     chlorthalidone (HYGROTON) 50 MG tablet     diclofenac (VOLTAREN) 1 % topical gel     diphenhydrAMINE (BENADRYL) 25 MG capsule     docusate sodium (COLACE) 100 MG capsule     famotidine (PEPCID) 20 MG tablet     fesoterodine fumarate (TOVIAZ) 4 MG TB24 24 hr tablet     fluticasone-salmeterol (ADVAIR) 250-50 MCG/ACT inhaler     hydrOXYzine (ATARAX) 25 MG tablet     insulin aspart (NOVOLOG FLEXPEN) 100 UNIT/ML pen     insulin glargine (LANTUS PEN) 100 UNIT/ML pen     liraglutide (VICTOZA) 18 MG/3ML solution     losartan (COZAAR) 100 MG tablet     melatonin 5 MG tablet     mometasone-formoterol (DULERA) 100-5 MCG/ACT inhaler     naloxone (NARCAN) 4 MG/0.1ML nasal spray     OMEPRAZOLE PO     polyethylene glycol 3350 POWD     polyvinyl alcohol (LIQUIFILM TEARS) 1.4 % ophthalmic solution     pramipexole (MIRAPEX) 0.25 MG tablet     prazosin (MINIPRESS) 2 MG capsule     QUEtiapine (SEROQUEL) 25 MG tablet     senna (SENOKOT) 8.6 MG tablet     Skin Protectants, Misc. (EUCERIN) cream     varenicline (CHANTIX) 1 MG tablet     No current facility-administered medications for this visit.       Social History     Tobacco Use     Smoking status: Former     Smokeless tobacco: Never   Vaping Use     Vaping status: Never Used   Substance Use Topics     Alcohol use: Not Currently     Drug use: Not Currently     Comment: 3 times a month       Health Maintenance Due   Topic Date Due     MICROALBUMIN  Never done     ASTHMA ACTION PLAN  Never done     ADVANCE CARE PLANNING  Never done     EYE EXAM  Never done     HEPATITIS B IMMUNIZATION (1 of 3 - 3-dose series) Never done     LUNG  CANCER SCREENING  Never done     COVID-19 Vaccine (3 - Booster for Moderna series) 08/06/2021       No results found for: PAP      April 14, 2023 10:46 AM

## 2023-04-18 ENCOUNTER — TELEPHONE (OUTPATIENT)
Dept: FAMILY MEDICINE | Facility: CLINIC | Age: 58
End: 2023-04-18

## 2023-04-18 NOTE — TELEPHONE ENCOUNTER
Discussed with Dr. Horta in person. He is unwilling to prescribe lactulose for constipation management as he has already prescribed Terri Miralax for the same reason. Attempted to call Terri to discuss this but was unable to reach her at it went straight to voicemail. LVM explaining that she needs to use her Miralax as prescribed at her last office visit on 4/14 and that she can call back if she has specific questions about how to use.    Aftab HUGGINS, RN  04/18/23 2:27 PM

## 2023-04-19 ENCOUNTER — NURSE TRIAGE (OUTPATIENT)
Dept: FAMILY MEDICINE | Facility: CLINIC | Age: 58
End: 2023-04-19

## 2023-04-19 NOTE — TELEPHONE ENCOUNTER
"Pt c/o on and off abdominal discomfort for \"a couple days.\"  See assessment below.  Tried Mylanta today and \"it helped a bit.\"  Pt said \"tell Dr. Horta to cancel the powder and order the Lactulose.\"  Explained to pt that she needs to take the Miralax every day, as she reports not taking it daily.  She will tell the staff she needs it every day.   If they don't give it to her daily, she will report that to us.    BHAVNA Salter, RN  04/19/23, 10:46 AM        Answer Assessment - Initial Assessment Questions  1. LOCATION: \"Where does it hurt?\"       Left side in between thigh and stomach  2. RADIATION: \"Does the pain shoot anywhere else?\" (e.g., chest, back)      No  3. ONSET: \"When did the pain begin?\" (e.g., minutes, hours or days ago)       Couple days ago, didn't hurt yesterday  4. SUDDEN: \"Gradual or sudden onset?\"      Sudden onset  5. PATTERN \"Does the pain come and go, or is it constant?\"     - If constant: \"Is it getting better, staying the same, or worsening?\"       (Note: Constant means the pain never goes away completely; most serious pain is constant and it progresses)      - If intermittent: \"How long does it last?\" \"Do you have pain now?\"      (Note: Intermittent means the pain goes away completely between bouts)      Comes and goes, but will stay all day  6. SEVERITY: \"How bad is the pain?\"  (e.g., Scale 1-10; mild, moderate, or severe)    - MILD (1-3): doesn't interfere with normal activities, abdomen soft and not tender to touch     - MODERATE (4-7): interferes with normal activities or awakens from sleep, abdomen tender to touch     - SEVERE (8-10): excruciating pain, doubled over, unable to do any normal activities       10  7. RECURRENT SYMPTOM: \"Have you ever had this type of stomach pain before?\" If Yes, ask: \"When was the last time?\" and \"What happened that time?\"       No  8. CAUSE: \"What do you think is causing the stomach pain?\"      Stress  9. RELIEVING/AGGRAVATING FACTORS: \"What makes " "it better or worse?\" (e.g., movement, antacids, bowel movement)      When people get on her nerves, her stomach starts hurting, bowels are messed up, staff doesn't give it to her every morning  10. OTHER SYMPTOMS: \"Do you have any other symptoms?\" (e.g., back pain, diarrhea, fever, urination pain, vomiting)        No  11. PREGNANCY: \"Is there any chance you are pregnant?\" \"When was your last menstrual period?\"        No    Protocols used: ABDOMINAL PAIN - FEMALE-A-AH      "

## 2023-05-05 ENCOUNTER — TELEPHONE (OUTPATIENT)
Dept: FAMILY MEDICINE | Facility: CLINIC | Age: 58
End: 2023-05-05

## 2023-05-05 NOTE — TELEPHONE ENCOUNTER
"Pt called to report she was \"at the hospital last week sometime.\"   She can't recall exact day(s).   She reports \"it was for pneumonia and I stayed over and then got released and then went back.\"   She reports \"not getting enough oxygen and they told me my kidneys are bad.\"  Unable to access ER record in chart.  She is scheduled for a follow-up visit with Dr. Horta on 5/11/2023 at 11am.    BHAVNA Salter, RN  05/05/23, 2:36 PM    "

## 2023-05-10 NOTE — PROGRESS NOTES
"  Assessment & Plan   Problem List Items Addressed This Visit        Musculoskeletal and Integumentary    Bilateral leg edema - Primary    Relevant Medications    furosemide (LASIX) 40 MG tablet    Other Relevant Orders    N terminal pro BNP outpatient    Basic metabolic panel      Chief suspicion for acute on chronic heart failure, possibly RIGHT sided (cor pulmonale) in the context of chronic respiratory issues. Pending BNP today. May need to have Terri seen at the hospital for acute diuresis with monitoring of kidney function and stabilization. For now, I will start lasix as noted to try and start reducing fluid volume with plan for Terri to return next week to repeat BMP and monitor kidney function    37 minutes spent on the date of the encounter doing chart review, history and exam, documentation and further activities as noted.    Arben Horta MD  [facility]    Subjective   Terri is a 58 year old, presenting for the following health issues:  RECHECK (Follow up on pneumonia.) and Musculoskeletal Problem (Swelling in both legs with have started to burst and drain. Has had something similar in 2019 or 2020. )    HPI   Worsening bilateral LE edema, shortness of breath  - currently recovering from asthma exacerbation/pulmonary infection  - lower extremity edema has worsened acutely to the point where she has has areas of drainage  - current no fever, chills  - Terri does report some mild nausea   - Terri also reports extreme fatigue; she falls asleep all the time during the day  - previously diagnosed JUSTEN; unclear how well this is being managed  - Terri reports she had this happen before and they \"wrapped\" her legs in \"pink\" bandages and it eventually got better    Review of Systems   Constitutional, HEENT, cardiovascular, pulmonary, gi and gu systems are negative, except as otherwise noted.      Objective    BP (!) 145/83 (BP Location: Right arm, Patient Position: Sitting, Cuff Size: Adult Regular)   " "Pulse 89   Temp 97.5  F (36.4  C) (Skin)   Resp 15   Ht 1.753 m (5' 9\")   Wt (!) 175.5 kg (387 lb)   SpO2 98%   BMI 57.15 kg/m    Body mass index is 57.15 kg/m .  Physical Exam   GENERAL: morbid obesity, starts to fall asleep repeatedly during our visit  RESP: moderately increased effort with breathing at baseline, lungs clear to auscultation - no rales, rhonchi or wheezes  CV: regular rate and rhythm, normal S1 S2, no S3 or S4, no murmur, click or rub, no peripheral edema and peripheral pulses strong  MS: significant pitting edema in bilateral LEs, currently with seepage of fluid from LEFT LE. Patient is wheelchair bound at baseline              "

## 2023-05-11 ENCOUNTER — TELEPHONE (OUTPATIENT)
Dept: FAMILY MEDICINE | Facility: CLINIC | Age: 58
End: 2023-05-11

## 2023-05-11 ENCOUNTER — OFFICE VISIT (OUTPATIENT)
Dept: FAMILY MEDICINE | Facility: CLINIC | Age: 58
End: 2023-05-11
Payer: COMMERCIAL

## 2023-05-11 VITALS
HEIGHT: 69 IN | BODY MASS INDEX: 43.4 KG/M2 | HEART RATE: 89 BPM | TEMPERATURE: 97.5 F | RESPIRATION RATE: 15 BRPM | WEIGHT: 293 LBS | SYSTOLIC BLOOD PRESSURE: 145 MMHG | DIASTOLIC BLOOD PRESSURE: 83 MMHG | OXYGEN SATURATION: 98 %

## 2023-05-11 DIAGNOSIS — R60.0 BILATERAL LEG EDEMA: Primary | ICD-10-CM

## 2023-05-11 LAB
ANION GAP SERPL CALCULATED.3IONS-SCNC: 12 MMOL/L (ref 7–15)
BUN SERPL-MCNC: 21.9 MG/DL (ref 6–20)
CALCIUM SERPL-MCNC: 9 MG/DL (ref 8.6–10)
CHLORIDE SERPL-SCNC: 103 MMOL/L (ref 98–107)
CREAT SERPL-MCNC: 1.11 MG/DL (ref 0.51–0.95)
DEPRECATED HCO3 PLAS-SCNC: 28 MMOL/L (ref 22–29)
GFR SERPL CREATININE-BSD FRML MDRD: 58 ML/MIN/1.73M2
GLUCOSE SERPL-MCNC: 129 MG/DL (ref 70–99)
NT-PROBNP SERPL-MCNC: 168 PG/ML (ref 0–900)
POTASSIUM SERPL-SCNC: 4.4 MMOL/L (ref 3.4–5.3)
SODIUM SERPL-SCNC: 143 MMOL/L (ref 136–145)

## 2023-05-11 PROCEDURE — 80048 BASIC METABOLIC PNL TOTAL CA: CPT | Performed by: FAMILY MEDICINE

## 2023-05-11 PROCEDURE — 83880 ASSAY OF NATRIURETIC PEPTIDE: CPT | Performed by: FAMILY MEDICINE

## 2023-05-11 RX ORDER — FUROSEMIDE 40 MG
40 TABLET ORAL DAILY
Qty: 21 TABLET | Refills: 0 | Status: SHIPPED | OUTPATIENT
Start: 2023-05-11

## 2023-05-11 ASSESSMENT — ANXIETY QUESTIONNAIRES
GAD7 TOTAL SCORE: 21
GAD7 TOTAL SCORE: 21
2. NOT BEING ABLE TO STOP OR CONTROL WORRYING: NEARLY EVERY DAY
1. FEELING NERVOUS, ANXIOUS, OR ON EDGE: NEARLY EVERY DAY
5. BEING SO RESTLESS THAT IT IS HARD TO SIT STILL: NEARLY EVERY DAY
IF YOU CHECKED OFF ANY PROBLEMS ON THIS QUESTIONNAIRE, HOW DIFFICULT HAVE THESE PROBLEMS MADE IT FOR YOU TO DO YOUR WORK, TAKE CARE OF THINGS AT HOME, OR GET ALONG WITH OTHER PEOPLE: VERY DIFFICULT
7. FEELING AFRAID AS IF SOMETHING AWFUL MIGHT HAPPEN: NEARLY EVERY DAY
6. BECOMING EASILY ANNOYED OR IRRITABLE: NEARLY EVERY DAY
3. WORRYING TOO MUCH ABOUT DIFFERENT THINGS: NEARLY EVERY DAY

## 2023-05-11 ASSESSMENT — PATIENT HEALTH QUESTIONNAIRE - PHQ9
5. POOR APPETITE OR OVEREATING: NEARLY EVERY DAY
SUM OF ALL RESPONSES TO PHQ QUESTIONS 1-9: 19

## 2023-05-11 ASSESSMENT — ASTHMA QUESTIONNAIRES: ACT_TOTALSCORE: 15

## 2023-05-11 NOTE — NURSING NOTE
"58 year old  Chief Complaint   Patient presents with     RECHECK     Follow up on pneumonia.     Musculoskeletal Problem     Swelling in both legs with have started to burst and drain. Has had something similar in 2019 or 2020.        Blood pressure (!) 145/83, pulse 89, temperature 97.5  F (36.4  C), temperature source Skin, resp. rate 15, height 1.753 m (5' 9\"), weight (!) 175.5 kg (387 lb), SpO2 98 %. Body mass index is 57.15 kg/m .  Patient Active Problem List   Diagnosis     Alcohol use disorder, severe, dependence (H)     Microcytic anemia     Anxiety     Atrophic vaginitis     Bilateral leg cramps     Bilateral leg edema     Breast pain     Cataract, right     Fibromyalgia     Chronic tension-type headache, intractable     Chronic venous insufficiency     Closed fracture of base of fifth metatarsal bone of left foot at metaphyseal-diaphyseal junction     Cocaine substance abuse (H)     Constipation     Dental erosion     Dry eyes, bilateral     Dyslipidemia     Dyspnea on exertion     Gastroesophageal reflux disease     Greater trochanteric bursitis of both hips     Hepatic steatosis     History of alcohol abuse     Hot flashes     Hydradenitis     Insomnia     Lichen simplex chronicus     Major depressive disorder, recurrent severe without psychotic features (H)     Migraine     Morbid obesity (H)     Multiple drug resistant organism (MDRO) culture positive     Myopia of both eyes with astigmatism and presbyopia     JUSTEN (obstructive sleep apnea)     Osteoarthrosis     Pain in joint of left shoulder     Pain of left clavicle     Posttraumatic stress disorder     Recurrent UTI     Restless legs     Small bowel obstruction due to adhesions (H)     Diabetes mellitus, type 2 (H)     Moderate persistent asthma without complication       Wt Readings from Last 2 Encounters:   05/11/23 (!) 175.5 kg (387 lb)   03/17/23 (!) 161 kg (355 lb)     BP Readings from Last 3 Encounters:   05/11/23 (!) 145/83   04/14/23 (!) " 154/85   03/17/23 (!) 159/90         Current Outpatient Medications   Medication     acetaminophen (TYLENOL) 500 MG tablet     albuterol (PROAIR HFA/PROVENTIL HFA/VENTOLIN HFA) 108 (90 Base) MCG/ACT inhaler     albuterol (PROVENTIL) (2.5 MG/3ML) 0.083% neb solution     atorvastatin (LIPITOR) 80 MG tablet     benzocaine (ANBESOL) 10 % gel     Blood Glucose Monitoring Suppl MISC     Blood Pressure Monitoring (BLOOD PRESSURE MONITOR/L CUFF) MISC     calcium carbonate 500 mg, elemental, 1250 (500 Ca) MG tablet chewable     carvedilol (COREG) 25 MG tablet     chlorthalidone (HYGROTON) 50 MG tablet     diclofenac (VOLTAREN) 1 % topical gel     diphenhydrAMINE (BENADRYL) 25 MG capsule     docusate sodium (COLACE) 100 MG capsule     famotidine (PEPCID) 20 MG tablet     fesoterodine fumarate (TOVIAZ) 4 MG TB24 24 hr tablet     fluticasone-salmeterol (ADVAIR) 250-50 MCG/ACT inhaler     hydrOXYzine (ATARAX) 25 MG tablet     insulin aspart (NOVOLOG FLEXPEN) 100 UNIT/ML pen     insulin glargine (LANTUS PEN) 100 UNIT/ML pen     ipratropium - albuterol 0.5 mg/2.5 mg/3 mL (DUONEB) 0.5-2.5 (3) MG/3ML neb solution     liraglutide (VICTOZA) 18 MG/3ML solution     losartan (COZAAR) 100 MG tablet     melatonin 5 MG tablet     mometasone-formoterol (DULERA) 100-5 MCG/ACT inhaler     naloxone (NARCAN) 4 MG/0.1ML nasal spray     OMEPRAZOLE PO     polyethylene glycol (MIRALAX) 17 GM/Dose powder     polyethylene glycol 3350 POWD     polyvinyl alcohol (LIQUIFILM TEARS) 1.4 % ophthalmic solution     pramipexole (MIRAPEX) 0.25 MG tablet     prazosin (MINIPRESS) 2 MG capsule     QUEtiapine (SEROQUEL) 25 MG tablet     senna (SENOKOT) 8.6 MG tablet     Skin Protectants, Misc. (EUCERIN) cream     varenicline (CHANTIX) 1 MG tablet     No current facility-administered medications for this visit.       Social History     Tobacco Use     Smoking status: Former     Smokeless tobacco: Never   Vaping Use     Vaping status: Never Used   Substance Use  Topics     Alcohol use: Not Currently     Drug use: Not Currently     Comment: 3 times a month       Health Maintenance Due   Topic Date Due     MICROALBUMIN  Never done     ASTHMA ACTION PLAN  Never done     ADVANCE CARE PLANNING  Never done     EYE EXAM  Never done     HEPATITIS B IMMUNIZATION (1 of 3 - 3-dose series) Never done     LUNG CANCER SCREENING  Never done     COVID-19 Vaccine (3 - Moderna series) 08/06/2021     MAMMO SCREENING  06/08/2023       No results found for: PAP      May 11, 2023 10:56 AM

## 2023-05-11 NOTE — TELEPHONE ENCOUNTER
Message left for the Nurse at the treatment home where Terri currently resides.    MARLY RiderN, RN, [facility]  05/11/23  2:07 PM  Phone: [phone]

## 2023-05-11 NOTE — LETTER
May 17, 2023      Terri Mullen  [address]  [facility] [address]        Aileen Murray,    Here are the results from your recent labs. The good news is your heart appears to be doing just fine. We need to keep an eye on your kidney function and hopefully the Lasix and your compression stockings help to reduce the extra fluid in your legs.     Resulted Orders   N terminal pro BNP outpatient   Result Value Ref Range    N Terminal Pro BNP Outpatient 168 0 - 900 pg/mL      Comment:      Reference range shown and results flagged as abnormal are for the outpatient, non acute settings. Establishing a baseline value for each individual patient is useful for follow-up.    Suggested inpatient cut points for confirming diagnosis of CHF in an acute setting are:  >450 pg/mL (age 18 to less than 50)  >900 pg/mL (age 50 to less than 75)  >1800 pg/mL (75 yrs and older)    An inpatient or emergency department NT-proPBNP <300 pg/mL effectively rules out acute CHF, with 99% negative predictive value.       Basic metabolic panel   Result Value Ref Range    Sodium 143 136 - 145 mmol/L    Potassium 4.4 3.4 - 5.3 mmol/L    Chloride 103 98 - 107 mmol/L    Carbon Dioxide (CO2) 28 22 - 29 mmol/L    Anion Gap 12 7 - 15 mmol/L    Urea Nitrogen 21.9 (H) 6.0 - 20.0 mg/dL    Creatinine 1.11 (H) 0.51 - 0.95 mg/dL    Calcium 9.0 8.6 - 10.0 mg/dL    Glucose 129 (H) 70 - 99 mg/dL    GFR Estimate 58 (L) >60 mL/min/1.73m2      Comment:      eGFR calculated using 2021 CKD-EPI equation.       Let me know if you have any questions.      Sincerely,      Arben Horta MD

## 2023-05-16 ENCOUNTER — NURSE TRIAGE (OUTPATIENT)
Dept: NURSING | Facility: CLINIC | Age: 58
End: 2023-05-16

## 2023-05-16 NOTE — TELEPHONE ENCOUNTER
Call from Clarisa, triage nurse, that works with patient's residence. Clarisa wanted to update patient's PCP.    Clarisa reports patient contacted her emergency service and EMS was sent to assess her. [facility] was [facility]. Clarisa says the issues is unknown area of pain but she suspected it may be her legs which she recently had issues with.    Advised will route her update to the clinic to follow up with patient.    Sirisha Yost RN, BSN  Triage Nurse Advisor      Reason for Disposition    [1] Follow-up call from patient regarding patient's clinical status AND [2] information NON-URGENT    Protocols used: PCP CALL - NO TRIAGE-A-

## 2024-09-19 ENCOUNTER — HOSPITAL ENCOUNTER (OUTPATIENT)
Dept: BEHAVIORAL HEALTH | Facility: CLINIC | Age: 59
Discharge: HOME | End: 2024-09-19
Attending: FAMILY MEDICINE | Admitting: FAMILY MEDICINE
Payer: COMMERCIAL

## 2024-09-19 VITALS — HEIGHT: 69 IN | BODY MASS INDEX: 43.4 KG/M2 | WEIGHT: 293 LBS

## 2024-09-19 DIAGNOSIS — F12.10 CANNABIS USE DISORDER, MILD, ABUSE: ICD-10-CM

## 2024-09-19 DIAGNOSIS — F14.20 COCAINE USE DISORDER, SEVERE, DEPENDENCE (H): Primary | ICD-10-CM

## 2024-09-19 DIAGNOSIS — F17.200 TOBACCO USE DISORDER, MODERATE, DEPENDENCE: ICD-10-CM

## 2024-09-19 PROCEDURE — H0001 ALCOHOL AND/OR DRUG ASSESS: HCPCS

## 2024-09-19 ASSESSMENT — COLUMBIA-SUICIDE SEVERITY RATING SCALE - C-SSRS
ATTEMPT LIFETIME: NO
5. HAVE YOU STARTED TO WORK OUT OR WORKED OUT THE DETAILS OF HOW TO KILL YOURSELF? DO YOU INTEND TO CARRY OUT THIS PLAN?: NO
2. HAVE YOU ACTUALLY HAD ANY THOUGHTS OF KILLING YOURSELF?: SEE ABOVE
2. HAVE YOU ACTUALLY HAD ANY THOUGHTS OF KILLING YOURSELF?: SEE ABOVE
5. HAVE YOU STARTED TO WORK OUT OR WORKED OUT THE DETAILS OF HOW TO KILL YOURSELF? DO YOU INTEND TO CARRY OUT THIS PLAN?: NO
1. HAVE YOU WISHED YOU WERE DEAD OR WISHED YOU COULD GO TO SLEEP AND NOT WAKE UP?: YES
2. HAVE YOU ACTUALLY HAD ANY THOUGHTS OF KILLING YOURSELF?: YES
2. HAVE YOU ACTUALLY HAD ANY THOUGHTS OF KILLING YOURSELF?: YES
6. HAVE YOU EVER DONE ANYTHING, STARTED TO DO ANYTHING, OR PREPARED TO DO ANYTHING TO END YOUR LIFE?: NO
TOTAL  NUMBER OF INTERRUPTED ATTEMPTS LIFETIME: NO
1. IN THE PAST MONTH, HAVE YOU WISHED YOU WERE DEAD OR WISHED YOU COULD GO TO SLEEP AND NOT WAKE UP?: YES
4. HAVE YOU HAD THESE THOUGHTS AND HAD SOME INTENTION OF ACTING ON THEM?: NO
TOTAL  NUMBER OF ABORTED OR SELF INTERRUPTED ATTEMPTS LIFETIME: NO
3. HAVE YOU BEEN THINKING ABOUT HOW YOU MIGHT KILL YOURSELF?: NO
4. HAVE YOU HAD THESE THOUGHTS AND HAD SOME INTENTION OF ACTING ON THEM?: NO

## 2024-09-19 ASSESSMENT — PATIENT HEALTH QUESTIONNAIRE - PHQ9
SUM OF ALL RESPONSES TO PHQ QUESTIONS 1-9: 25
SUM OF ALL RESPONSES TO PHQ QUESTIONS 1-9: 25
10. IF YOU CHECKED OFF ANY PROBLEMS, HOW DIFFICULT HAVE THESE PROBLEMS MADE IT FOR YOU TO DO YOUR WORK, TAKE CARE OF THINGS AT HOME, OR GET ALONG WITH OTHER PEOPLE: EXTREMELY DIFFICULT

## 2024-09-19 ASSESSMENT — ANXIETY QUESTIONNAIRES
GAD7 TOTAL SCORE: 21
8. IF YOU CHECKED OFF ANY PROBLEMS, HOW DIFFICULT HAVE THESE MADE IT FOR YOU TO DO YOUR WORK, TAKE CARE OF THINGS AT HOME, OR GET ALONG WITH OTHER PEOPLE?: EXTREMELY DIFFICULT
7. FEELING AFRAID AS IF SOMETHING AWFUL MIGHT HAPPEN: NEARLY EVERY DAY

## 2024-09-19 ASSESSMENT — PAIN SCALES - GENERAL: PAINLEVEL: EXTREME PAIN (8)

## 2024-09-19 NOTE — PROGRESS NOTES
Safety Plan:    Adult Short Safety Plan:   Name: Terri Mullen  YOB: 1965  Date: 9/19/2024     My primary care provider: Arben Horta.    My primary care clinic:  [facility]     My prescriber: Arben Horta.    Other care team support:  Her medical providers at her assisted living apartment   My Triggers: Relationship conflict, Being socially isolated, Deaths of her family members, Financial concerns, Medical Health, and Substance Use.     Additional People, Places, and Things that I can access for support: The LPN in her assisted living apartment and her sister who lives out of state.         What is important to me and makes life worth living: Her niece and her nephews\.       GREEN    Good Control  1. I feel good  2. No suicidal thoughts   3. Can work, sleep and play      Action Steps  1. Self-care: balanced meals, exercising, sleep practices, etc.  2. Take your medications as prescribed.  3. Continue meetings with therapist and prescriber.  4.  Do the healthy things that I enjoy.             YELLOW  Getting Worse  I have ANY of these:  1. I do not feel good  2. Difficulty Concentrating  3. Sleep is changing  4. Increase/Change in my thoughts to hurt self and/or others, but I can still manage and not act on it.   5. Not taking care of self.  6. Ongoing substance abuse.             Action Steps (in addition to the above):  1. Inform your therapist and psychiatric prescriber/PCP.  2. Keep taking your medications as prescribed.    3. Turn to people you can ask for help.  4. Use internal coping strategies -see below.  5. Create safe environment: notify friends/family of increase in symptoms             RED  Get Help  If I have ANY of these:  1. Current and uncontrollable thoughts and/or behaviors to hurt self and/or others.      Actions to manage my safety  1. Contact your emergency person   Neha Mullen (Sister)  Tel #: [phone]   2. Call or Text [phone]  3. Call my crisis team- Luz  [place] [phone] [facility]  3. Or Call 911 or go to the emergency room right away          My Internal Coping Strategies include the following:  deep breathing exercises, use my coping skills, and doing arts and crafts    [End for Brief Safety Plan]     Safety Concerns  How To Identify Situations That Make Your Mental Health Worse:  Triggers are things that make your mental health worse.  Look at the list below to help you find your triggers and what you can do about them.     1. Identify Early Warning Signs:    Sometimes symptoms return, even when people do their best to stay well. Symptoms can develop over a short period of time with little or no warning, but most of the time they emerge gradually over several weeks.  Early warning signs are changes that people experience when a relapse is starting. Some early warning signs are common and others are not as common.   Common Early Warning Signs:    Feeling tense or nervous, Eating less or eating more, Trouble sleeping -either too much or too little sleep, Feeling depressed or low, Feeling irritable, Feeling like not being around other people, and Urges to use drugs or alcohol     2. Identify action steps to take when warning signs are noticed:    Taking Action- It is important to take action if you are experiencing early warning signs of a relapse.  The faster you act, the more likely it is that you can avoid a full relapse.  It is helpful to identify several specific ways to cope with symptoms.      The following is my list of symptoms and coping strategies that I can use when they are present:    Symptom Coping Strategies   Anxiety -Talk with someone in your support system and let him or her know how you are feeling.  -Use relaxation techniques such as deep breathing or imagery.  -Use positive affirmations to counteract negative self-talk such as  I am learning to let go of worry.    Depression - Schedule your day; include  activities you have to do and activities you enjoy doing.  - Get some exercise - walk, run, bike, or swim.  - Give yourself credit for even the smallest things you get done.   Sleep Difficulties   - Go to sleep at the same time every day.  - Do something relaxing before bed, such as drinking herbal tea or listening to music.  - Avoid having discussions about upsetting topics before going to bed.   Delusions   - Distract yourself from the disturbing thought by doing something that requires your attention such as a puzzle.  - Check out your beliefs by talking to someone you trust.    Hallucinations   - Use headphones to listen to music.  - Tell voices to  stop  or say to yourself,  I am safe.   - Ignore the hallucinations as much as possible; focus on other things.   Concentration Difficulties - Minimize distractions so there is only one thing for you to focus on at a time.    - Ask the person you are having a conversation with to slow down or repeat things you are unsure of.      The patient was provided with a paper copy of the above safety plan on 9/19/2024.    Provider Name/ Credentials:  LELE Carlin  9/19/2024

## 2024-09-19 NOTE — PROGRESS NOTES
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 victim of crime.  The patient denied having any history of suicide attempts and denied having any current suicide ideation.  The patient denied having any history of self-injurious behavior.   Concerns for possible neglect are not present.    Safety Assessment:   The patient denies current homicidal ideation and behaviors.  The patient denies current self-injurious ideation and behaviors.    The patient reported having mental health problems, reported having health problems, and using illicit drugs with unknown contents and purity associated with substance  use.  The patient reported substance use and reported impulsive decision making associated with mental health symptoms.  The patient reported the following current concerns for their personal safety: None.  The patient reported there are not any firearms in the home.     History of Safety Concerns:  The patient denied a history of homicidal ideation.     The patient denied a history of personal safety concerns.    The patient denied a history of assaultive behaviors.    The patient denied having any history of sexual assault behaviors.  The patient denied having any history of being registered as a sex offender.  The patient reported a history of having mental health problems, reported a history of having health problems, and reported a history of using illicit drugs with unknown contents and purity associated with substance use.  The patient reported a history of substance use and reported a history of impulsive decision making associated with mental health symptoms.  The patient reported the following protective factors: forward/future oriented thinking and adherence with prescribed medication.    Risk Plan:  See Recommendations for Safety and Risk Management Plan    Review of Symptoms per patient report:  Substance Use:  some memory impairment due to substance use, significant withdrawal symptoms, substance use related medical issues, substance use related mental health issues, daily use, cravings/urges to use, family relationship problems due to substance use, and social problems related to substance use.     Diagnostic Criteria:   1.)  Substance is often taken in larger amounts or over a longer period than was intended.  Met for:  alcohol and crack cocaine.  2.)  There is persistent desire or unsuccessful efforts to cut down or control use of the substance.  Met for:  alcohol, cannabis/THC, crack cocaine, and nicotine.  3.)  A great deal of time is spent in activities necessary to obtain the substance, use the  substance, or recover from its effects.  Met for:  alcohol and crack cocaine.  4.)  Craving, or a strong desire or urge to use the substance.  Met for:  alcohol, cannabis/THC, crack cocaine, and nicotine.  6.)  Continued use of the substance despite having persistent or recurrent social or interpersonal problems caused or exacerbated by the effects of its use.  Met for:  alcohol and crack cocaine.  7.)  Important social, occupational, or recreational activities are given up or reduced because of the substance.  Met for:  alcohol and crack cocaine.  9.)  Use of the substance is continued despite knowledge of having a persistent or recurrent physical or psychological problem that is likely to have been cause or exacerbated by the substance.  Met for:  alcohol, cannabis/THC, and crack cocaine.  10.)  Tolerance:  either a need for markedly increased amounts of the substance to achieve the desired effect or a markedly diminished effect with continued use of the same amount of the substance.  Met for:  alcohol, crack cocaine, and nicotine.  11.)  Withdrawal:  either patient endorses characteristic withdrawal syndrome for the substance or the substance (or closely related substance) is taken to relieve or avoid withdrawal symptoms.  Met for:  alcohol, crack cocaine, and nicotine.    Collateral Contact Summary:   Collateral contacts contributing to this assessment:  The patient's electronic medical records were reviewed at time of assessment.    No additional collateral data had been obtained at the time of this documentation.     If court related records were reviewed, summarize here: None    Information from collateral contacts supported/largely agreed with information from the client and associated risk ratings.    Information in this assessment was obtained from the medical record and provided by the patient who is a good historian.        The patient will have open access to her substance use disorder assessment  medical record.    As evidenced by self report and criteria, the patient meets the following DSM-5 Diagnoses: (Sustained by DSM-5 Criteria Listed Above)      1.)  Alcohol Use Disorder Severe - 303.90 (F10.20)  2.)  Cocaine Use Disorder Severe - 304.20 (F14.20)  3.)  Cannabis Use Disorder Mild - 305.20 (F12.10)  4.)  Tobacco Use Disorder Moderate - 305.10 (F17.200)  5.)  MDD, per patient self-report  6.)  PTSD, per patient self-report    Specify if: In early remission:  After full criteria for alcohol/drug use disorder were previously met, none of the criteria for alcohol/drug use disorder have been met for at least 3 months but for less than 12 months (with the exception that Criterion A4,  Craving or a strong desire or urge to use alcohol/drug  may be met).     In sustained remission:   After full criteria for alcohol use disorder were previously met, none of the criteria for alcohol/drug use disorder have been met at any time during a period of 12 months or longer (with the exception that Criterion A4,  Craving or strong desire or urge to use alcohol/drug  may be met).     Specify if:   This additional specifier is used if the individual is in an environment where access to alcohol is restricted.    Mild: Presence of 2-3 symptoms  Moderate: Presence of 4-5 symptoms  Severe: Presence of 6 or more symptoms    Recommendations:     1. Plan for Safety and Risk Management:     Recommended that patient call 911 or go to the local ED should there be a change in any of these risk factors..            Report to child / adult protection services was not needed.    2. MARE Referrals:     Recommendations:      1.)  If the patient were to start experiencing significant withdrawal symptoms as she was in the process of stopping her use of alcohol in preparation of entering treatment, it would be recommended she go to the [facility] Emergency Department or go to a different hospital's Emergency Department to be  evaluated for an Inpatient detoxification admission on the 3A detoxification unit at [facility] or on a similar medical unit to complete a detoxification off of alcohol under medical supervision as needed.    2.)  Abstain from alcohol and from all other non-prescribed mood altering chemicals.   3.)  Have a mental health evaluation to address her current clinical mental health issues while on a residential or board and lodging substance use disorder treatment program unit.  4.)  Follow all of the recommendations of her medical and mental health providers.  5.)  Enter the [facility] program at [facility] in [place], [place] or enter a similar residential or board and lodging treatment program for substance use disorder treatment.  6.)  Follow all of the recommendations of her substance use disorder treatment providers including entering an extended care program as needed.        The patient reported she was willing to follow the above recommendations.      The patient meets criteria for the following levels of care based on ASAM Criteria:      Withdrawal Management - If the patient were to start experiencing significant withdrawal symptoms as she was in the process of stopping her use of alcohol in preparation of entering treatment, it would be recommended she go to the [facility] Emergency Department or go to a different hospital's Emergency Department to be evaluated for an Inpatient detoxification admission on the 3A detoxification unit at [facility] or on a similar medical unit to complete a detoxification off of alcohol under medical supervision as needed.    Treatment/Recovery Services - 3.5 Clinically Managed Medium and High Intensity Residential Services.      The patient's placement aligns with the assessment and placement level of care recommendation based on current ASAM Dimension scale ratings.       The patient would like the following family or  other support people involved in their treatment:  None at this time.  The patient does not have any history of opioid abuse.      3.  Mental Health Referrals:     The patient would benefit from having a mental health evaluation to address her current mental health issues while on a residential or board and lodging substance use disorder treatment program unit.    4. Clinical Substantiation for the above recommendations: The patient has been unable to maintain abstinence from alcohol and crack cocaine while living at her current home environment, would benefit from developing long-term sober maintenance skills, would benefit from developing sober coping skills, would benefit from developing a sober peer support network, has dual issues of mental health and substance abuse, has mental health symptoms which are exacerbated by substance abuse, and has medical issues which are exacerbated by substance abuse.    5.  Cultural Concerns:    The patient did not identify having any cultural concerns regarding mental health, physical health, or substance use issues.     6. Recommendations for treatment focus:      Alcohol / Substance Use - See #2. MARE Referrals above for details on recommendations.    7. Interactive Complexity: No    8. Safety Plan:  When the patient identifies the following:  Suicidal Ideation Without method, intent, plan, or behavior (Yes to C-SSRS Suicidal Ideation #1 or #2 and No to #3,4,5)    The following is recommended:   Complete/Review/Update Safety Plan    Safety Plan:    Adult Short Safety Plan:   Name: Terri Mullen  YOB: 1965  Date: 9/19/2024     My primary care provider: Arben Horta    My primary care clinic: [facility]     My prescriber: Arben Horta.    Other care team support:  Her medical providers at her assisted living apartment   My Triggers: Relationship conflict, Being socially isolated, Deaths of her family members, Financial concerns, Medical Health, and  Substance Use.     Additional People, Places, and Things that I can access for support: The LPN in her assisted living apartment and her sister who lives out of state.         What is important to me and makes life worth living: Her niece and her nephews\.       GREEN    Good Control  1. I feel good  2. No suicidal thoughts   3. Can work, sleep and play      Action Steps  1. Self-care: balanced meals, exercising, sleep practices, etc.  2. Take your medications as prescribed.  3. Continue meetings with therapist and prescriber.  4.  Do the healthy things that I enjoy.             YELLOW  Getting Worse  I have ANY of these:  1. I do not feel good  2. Difficulty Concentrating  3. Sleep is changing  4. Increase/Change in my thoughts to hurt self and/or others, but I can still manage and not act on it.   5. Not taking care of self.  6. Ongoing substance abuse.             Action Steps (in addition to the above):  1. Inform your therapist and psychiatric prescriber/PCP.  2. Keep taking your medications as prescribed.    3. Turn to people you can ask for help.  4. Use internal coping strategies -see below.  5. Create safe environment: notify friends/family of increase in symptoms             RED  Get Help  If I have ANY of these:  1. Current and uncontrollable thoughts and/or behaviors to hurt self and/or others.      Actions to manage my safety  1. Contact your emergency person   Neha Mullen (Sister)  Tel #: [phone]   2. Call or Text [phone]  3. Call my crisis team- [facility] [phone] [facility]  3. Or Call 911 or go to the emergency room right away          My Internal Coping Strategies include the following:  deep breathing exercises, use my coping skills, and doing arts and crafts    [End for Brief Safety Plan]     Safety Concerns  How To Identify Situations That Make Your Mental Health Worse:  Triggers are things that make your mental health worse.  Look at the list below  to help you find your triggers and what you can do about them.     1. Identify Early Warning Signs:    Sometimes symptoms return, even when people do their best to stay well. Symptoms can develop over a short period of time with little or no warning, but most of the time they emerge gradually over several weeks.  Early warning signs are changes that people experience when a relapse is starting. Some early warning signs are common and others are not as common.   Common Early Warning Signs:    Feeling tense or nervous, Eating less or eating more, Trouble sleeping -either too much or too little sleep, Feeling depressed or low, Feeling irritable, Feeling like not being around other people, and Urges to use drugs or alcohol     2. Identify action steps to take when warning signs are noticed:    Taking Action- It is important to take action if you are experiencing early warning signs of a relapse.  The faster you act, the more likely it is that you can avoid a full relapse.  It is helpful to identify several specific ways to cope with symptoms.      The following is my list of symptoms and coping strategies that I can use when they are present:    Symptom Coping Strategies   Anxiety -Talk with someone in your support system and let him or her know how you are feeling.  -Use relaxation techniques such as deep breathing or imagery.  -Use positive affirmations to counteract negative self-talk such as  I am learning to let go of worry.    Depression - Schedule your day; include activities you have to do and activities you enjoy doing.  - Get some exercise - walk, run, bike, or swim.  - Give yourself credit for even the smallest things you get done.   Sleep Difficulties   - Go to sleep at the same time every day.  - Do something relaxing before bed, such as drinking herbal tea or listening to music.  - Avoid having discussions about upsetting topics before going to bed.   Delusions   - Distract yourself from the disturbing  thought by doing something that requires your attention such as a puzzle.  - Check out your beliefs by talking to someone you trust.    Hallucinations   - Use headphones to listen to music.  - Tell voices to  stop  or say to yourself,  I am safe.   - Ignore the hallucinations as much as possible; focus on other things.   Concentration Difficulties - Minimize distractions so there is only one thing for you to focus on at a time.    - Ask the person you are having a conversation with to slow down or repeat things you are unsure of.      The patient was provided with a paper copy of the above safety plan on 9/19/2024.     Provider Name/ Credentials:  [facility]  September 19, 2024

## 2024-09-23 ENCOUNTER — TELEPHONE (OUTPATIENT)
Dept: BEHAVIORAL HEALTH | Facility: CLINIC | Age: 59
End: 2024-09-23
Payer: COMMERCIAL

## 2024-09-23 NOTE — TELEPHONE ENCOUNTER
----- Message from Flako Valenzuela sent at 9/23/2024  2:48 PM CDT -----  Regarding: cancel admission  Please cancel this admission for today at 3 pm.

## 2024-09-23 NOTE — TELEPHONE ENCOUNTER
----- Message from Flako Valenzuela sent at 9/23/2024 10:03 AM CDT -----  Regarding: schedule admission  Scheduling Request    Patient Name: Terri Mullen  Location of programming: [facility]  Start Date: September / 23 / 2024  Group:  YULISSA on Monday at 3:00 PM   Attending Provider (MD): Erin  Duration of Appointment in minutes: 840  Visit Type: In-person or Treatment - 870    Additional notes: direct admission